# Patient Record
Sex: FEMALE | Race: WHITE | NOT HISPANIC OR LATINO | Employment: OTHER | URBAN - METROPOLITAN AREA
[De-identification: names, ages, dates, MRNs, and addresses within clinical notes are randomized per-mention and may not be internally consistent; named-entity substitution may affect disease eponyms.]

---

## 2020-02-04 NOTE — PROGRESS NOTES
Subjective:     Encounter Date:02/06/2020 - [place] Office    Patient ID: Regine Alfred is a 54 y.o.  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     REFERRING PROVIDER: JOANNE Santiago  REMOTE CARDIOLOGIST: David Rome MD  INTERVENTIONALIST: David Rome MD  ANGIOGRAPHER: RENETTA San MD, [facility], [facility]/Bianca August MD, [facility]    Chief Complaint:   Chief Complaint   Patient presents with   • Coronary Artery Disease     Consult      Problem List:  1. Ischemic heart disease:  a. NSTEMI and left heart catheterization 1/22/2005: Normal coronary arteries, preserved LV systolic function and wall motion, LVEF 55%, there is an area of apical dyskinesis, mild luminal irregularities are present within all 3 vessels with recommendations for aggressive risk factor modification  b. Residual class I symptoms  2. Hyperlipidemia; on statin therapy  3. Osteoarthritis  4. Fibromyalgia  5. Buerger's disease  6. Peripheral vascular disease/claudication  a. [place] [facility] [place]. San): Very mild atherosclerotic plaque of the left subclavian artery, not hemodynamically significant, severe disease of the digital arteries of the left ring finger with subtotal occlusion  b. Abnormal FRANKO, left greater than right, left FRANKO 0.91, right FRANKO 0.97 October 2016  c. Remote CTA relating to AIF with mild disease of her left renal artery with 90% anterior tibial artery occlusion on the right side and is status post angioplasty and Promus CHRISTOPHER November 2016  7. Current tobacco use; 1ppd x 40 years, has tried Chantix and nicotine patches unsuccessfully  8. Bilateral carotid bruits  9. Surgical history:  a. Appendectomy  b. LHC with femoral stent to left lower extremity  c. Tubal ligation  d. Right breast tumor  e. Partial removal of finger on left hand due to Buerger's disease    Allergies    Allergen Reactions   • Novocain [Procaine] Itching                Current Outpatient Medications:   •  brimonidine (ALPHAGAN) 0.2 % ophthalmic solution, Administer 1 drop to both eyes 2 (Two) Times a Day., Disp: , Rfl:   •  Cholecalciferol (VITAMIN D3) 1.25 MG (19556 UT) capsule, Take 50,000 Units by mouth Every 7 (Seven) Days., Disp: , Rfl:   •  clopidogrel (PLAVIX) 75 MG tablet, Take 75 mg by mouth Daily., Disp: , Rfl:   •  DULoxetine (CYMBALTA) 30 MG capsule, Take 30 mg by mouth Daily., Disp: , Rfl:   •  DULoxetine (CYMBALTA) 60 MG capsule, Take 60 mg by mouth Daily., Disp: , Rfl:   •  meloxicam (MOBIC) 7.5 MG tablet, Take 7.5 mg by mouth As Needed., Disp: , Rfl:   •  methocarbamol (ROBAXIN) 750 MG tablet, Take 750 mg by mouth As Needed for Muscle Spasms., Disp: , Rfl:   •  rosuvastatin (CRESTOR) 5 MG tablet, Take 5 mg by mouth Daily., Disp: , Rfl:   •  tiZANidine (ZANAFLEX) 4 MG tablet, Take 4 mg by mouth As Needed for Muscle Spasms., Disp: , Rfl:   •  traMADol (ULTRAM) 50 MG tablet, 50 mg 3 (Three) Times a Day., Disp: , Rfl:     History of Present Illness  This is a 54-year-old white female who presents to [facility] cardiology care and was remotely seen by Dr. Rome.  She had a heart attack at age 38, which she says was caused by a blood clot in her leg (?).  She did not have any stent placements at that time.  A few years ago, she did have peripheral vascular disease and claudication and had a femoral stent to the left lower extremity by Dr. Rome.  The patient has hyperlipidemia but denies any hypertension, type 2 diabetes mellitus, rheumatic fever, CVAs, TIAs, seizures, or PEs.  She remotely had a blood clot in her left lower extremity and has Buerger's disease.  She has been smoking 1-1.5 packs/day since she was a teenager.  She has tried Chantix and nicotine patches unsuccessfully multiple times during her lifetime.  She occasionally has chest discomfort, but she prays about it and rubs her chest, and it  goes away.  She occasionally takes an aspirin with the chest discomfort, and it resolves.  She denies any shortness of breath, palpitations, dizziness, presyncope, or syncope.  She has a family history of CAD.    Cardiovascular Disease Risk Factors  hyperlipidemia     Social History     Socioeconomic History   • Marital status:      Spouse name: Not on file   • Number of children: 5   • Years of education: Not on file   • Highest education level: Not on file   Tobacco Use   • Smoking status: Current Every Day Smoker     Packs/day: 1.00     Types: Cigarettes   • Smokeless tobacco: Never Used   Substance and Sexual Activity   • Alcohol use: Not Currently   • Drug use: Not Currently   • Sexual activity: Defer       Family History   Problem Relation Age of Onset   • Heart disease Mother    • Heart disease Father    • Heart disease Sister    • Heart attack Sister    • Heart disease Brother    • Heart disease Brother        Review of Systems   Constitution: Positive for weight gain.   HENT: Negative.    Eyes: Positive for vision loss in left eye and vision loss in right eye.   Cardiovascular: Positive for palpitations. Negative for chest pain, claudication, dyspnea on exertion, irregular heartbeat, leg swelling, near-syncope and syncope.   Respiratory: Positive for shortness of breath.    Endocrine: Negative.    Hematologic/Lymphatic:        Buerger's disease   Skin: Negative.    Musculoskeletal: Positive for arthritis.   Gastrointestinal: Negative.    Genitourinary: Positive for frequency.   Neurological: Positive for excessive daytime sleepiness. Negative for dizziness, focal weakness and seizures.   Psychiatric/Behavioral: The patient is nervous/anxious.       Obtained and negative except as outlined in problem list and HPI.      ECG 12 Lead  Date/Time: 2/6/2020 11:01 AM  Performed by: Chico Meredith MD  Authorized by: Chico Meredith MD   Rhythm comments: Normal sinus rhythm, normal ECG, 89 bpm QRS 70 ms,  " ms,  ms, no prior ECGs to review                 Objective:       Vitals:    02/06/20 0959 02/06/20 1015 02/06/20 1016 02/06/20 1017   BP: 120/64 114/60 116/62 112/60   BP Location: Right arm Right arm Left arm Left arm   Patient Position: Sitting Standing Sitting Standing   Pulse: 96      Weight: 85.7 kg (189 lb)      Height: 177.8 cm (70\")        Body mass index is 27.12 kg/m².     Physical Exam   Constitutional: She is oriented to person, place, and time. She appears well-developed and well-nourished.   HENT:   Mouth/Throat: Uvula is midline, oropharynx is clear and moist and mucous membranes are normal. No oral lesions. No uvula swelling or lacerations.   Edentulous   Eyes:   Fundoscopic exam:       The right eye shows AV nicking. The right eye shows no exudate and no hemorrhage.        The left eye shows AV nicking. The left eye shows no exudate and no hemorrhage.   Neck: No JVD present. Carotid bruit is present (bilateral, L>R). No thyromegaly present.   Cardiovascular: Regular rhythm, S1 normal and S2 normal. Exam reveals no gallop, no S3 and no friction rub.   Murmur heard.   Medium-pitched harsh early systolic murmur is present with a grade of 2/6 at the upper right sternal border, upper left sternal border and lower left sternal border.  Pulses:       Carotid pulses are 1+ on the right side, and 1+ on the left side.       Radial pulses are 1+ on the right side, and 1+ on the left side.        Femoral pulses are 1+ on the right side, and 1+ on the left side.       Popliteal pulses are 1+ on the right side, and 1+ on the left side.        Dorsalis pedis pulses are 1+ on the right side, and 1+ on the left side.        Posterior tibial pulses are 1+ on the right side, and 1+ on the left side.   Pulmonary/Chest: Effort normal. She has decreased breath sounds. She has no wheezes. She has no rhonchi. She has no rales.   Abdominal: Soft. She exhibits no mass. There is no hepatosplenomegaly. There is " no tenderness. There is no guarding.   Bowel sounds audible x4   Musculoskeletal: Normal range of motion. She exhibits no edema.   Lymphadenopathy:     She has no cervical adenopathy.   Neurological: She is alert and oriented to person, place, and time.   Skin: Skin is warm, dry and intact. No rash noted.   Vitals reviewed.      Lab Review:   1/16/2020:  · CBC: WBC 8, RBC 4.57, hemoglobin 14.2, hematocrit 42.7, MCV 93, MCH 31.1, MCHC 33.3, RDW 13.7, platelets 319, neutrophils 66, lymphocytes 22, monocytes 8, eos 3, basos 1  · CMP: Glucose 122, BUN 8, creatinine 1, GFR 64, sodium 143, potassium 3.7, chloride 102, carbon dioxide 24, calcium 9.6, protein 6.8, globulin 2.2, bilirubin 0.2, alkaline phosphatase 90, AST 23, ALT 19  · Lipid panel: Cholesterol 198, triglycerides 102, HDL 47,   · TSH - 1.74  · Free T4 - 1.05  · Vitamin D - 106    ECG 2/1/2016: Sinus rhythm, normal ECG, 50 bpm,  ms, QRS 80 ms,  ms      Assessment:   Patient with bilateral carotid bruits, left greater than right, in the setting of systolic heart murmur and current tobacco use.  We will order an echocardiogram to assess heart structure/function and also a carotid duplex in view of her bruits. I advised the patient of the risks of continuing to use tobacco, and I provided this patient with smoking cessation educational materials and discussed how to quit smoking and patient has not expressed the willingness to quit.  Counseled patient for 3-5 minutes.  She has tried Chantix and nicotine patches multiple times in the past unsuccessfully.  The patient had an abnormal lipid panel in January 2020, and we would recommend that she increase rosuvastatin to 10 mg daily.  She is aware of the risk of digital and limb loss with Buerger's disease and continued tobacco use.     Diagnosis Plan   1. IHD (ischemic heart disease)   No recurrent angina pectoris or CHF on current activity schedule; continue current treatment; will reassess  residual LV function and remote wall motion abnormality and cardiac murmur with an echocardiogram.     2. Hyperlipidemia LDL goal <100   Increase rosuvastatin to 10 mg daily   3. PVD (peripheral vascular disease) with claudication (CMS/HCC)   Carotid duplex   4. Tobacco use   I advised the patient of the risks of continuing to use tobacco, and I provided this patient with smoking cessation educational materials and discussed how to quit smoking and patient has expressed some willingness to quit.  Counseled patient for 3-5 minutes; she has tried Chantix and nicotine patches in the past without success.     5.  Bilateral carotid bruits: Carotid duplex       Plan:       1. Patient to continue current medications and close follow up with the above providers other than to increase rosuvastatin dose to 10 mg daily.  2. Tentative cardiology follow up in 6 weeks or patient may return sooner PRN.  3. Carotid duplex  4. Echocardiogram  5. Encouraged tobacco cessation  6. 1 800 card provided    Scribed for Chico Meredith MD by Kristen Arboleda, JOANNE. 2/6/2020  10:17 AM    I, Chico Meredith MD, [facility], personally performed the services described in this documentation as scribed by the above named individual in my presence, and it is both accurate and complete. At 11:33 AM on 02/06/2020

## 2020-02-06 ENCOUNTER — CONSULT (OUTPATIENT)
Dept: CARDIOLOGY | Facility: CLINIC | Age: 55
End: 2020-02-06

## 2020-02-06 VITALS
DIASTOLIC BLOOD PRESSURE: 60 MMHG | BODY MASS INDEX: 27.06 KG/M2 | WEIGHT: 189 LBS | SYSTOLIC BLOOD PRESSURE: 112 MMHG | HEART RATE: 96 BPM | HEIGHT: 70 IN

## 2020-02-06 DIAGNOSIS — I25.9 IHD (ISCHEMIC HEART DISEASE): Primary | ICD-10-CM

## 2020-02-06 DIAGNOSIS — R09.89 BILATERAL CAROTID BRUITS: ICD-10-CM

## 2020-02-06 DIAGNOSIS — E78.5 HYPERLIPIDEMIA LDL GOAL <100: ICD-10-CM

## 2020-02-06 DIAGNOSIS — Z72.0 TOBACCO USE: ICD-10-CM

## 2020-02-06 DIAGNOSIS — I73.9 PVD (PERIPHERAL VASCULAR DISEASE) WITH CLAUDICATION (HCC): ICD-10-CM

## 2020-02-06 PROBLEM — M06.09 RHEUMATOID ARTHRITIS OF MULTIPLE SITES WITH NEGATIVE RHEUMATOID FACTOR: Status: ACTIVE | Noted: 2020-02-06

## 2020-02-06 PROBLEM — I10 ESSENTIAL HYPERTENSION: Status: ACTIVE | Noted: 2020-02-06

## 2020-02-06 PROBLEM — I73.1 BUERGER'S DISEASE (HCC): Status: ACTIVE | Noted: 2020-02-06

## 2020-02-06 PROCEDURE — 93000 ELECTROCARDIOGRAM COMPLETE: CPT | Performed by: INTERNAL MEDICINE

## 2020-02-06 PROCEDURE — 99204 OFFICE O/P NEW MOD 45 MIN: CPT | Performed by: INTERNAL MEDICINE

## 2020-02-06 RX ORDER — ROSUVASTATIN CALCIUM 5 MG/1
5 TABLET, COATED ORAL DAILY
COMMUNITY
End: 2020-02-06 | Stop reason: DRUGHIGH

## 2020-02-06 RX ORDER — TIZANIDINE 4 MG/1
4 TABLET ORAL AS NEEDED
COMMUNITY

## 2020-02-06 RX ORDER — METHOCARBAMOL 750 MG/1
TABLET, FILM COATED ORAL AS NEEDED
COMMUNITY
End: 2022-07-18

## 2020-02-06 RX ORDER — ROSUVASTATIN CALCIUM 10 MG/1
10 TABLET, COATED ORAL DAILY
Qty: 90 TABLET | Refills: 3 | Status: SHIPPED | OUTPATIENT
Start: 2020-02-06 | End: 2021-02-09 | Stop reason: DRUGHIGH

## 2020-02-06 RX ORDER — MELOXICAM 7.5 MG/1
7.5 TABLET ORAL AS NEEDED
COMMUNITY
End: 2020-11-05

## 2020-02-06 RX ORDER — BRIMONIDINE TARTRATE 2 MG/ML
1 SOLUTION/ DROPS OPHTHALMIC 2 TIMES DAILY
COMMUNITY
Start: 2020-01-24 | End: 2022-07-18

## 2020-02-06 RX ORDER — CHOLECALCIFEROL (VITAMIN D3) 1250 MCG
50000 CAPSULE ORAL
COMMUNITY

## 2020-02-06 RX ORDER — DULOXETIN HYDROCHLORIDE 30 MG/1
30 CAPSULE, DELAYED RELEASE ORAL EVERY MORNING
COMMUNITY

## 2020-02-06 RX ORDER — DULOXETIN HYDROCHLORIDE 60 MG/1
60 CAPSULE, DELAYED RELEASE ORAL NIGHTLY
COMMUNITY

## 2020-02-06 RX ORDER — TRAMADOL HYDROCHLORIDE 50 MG/1
50 TABLET ORAL 3 TIMES DAILY
COMMUNITY
Start: 2020-01-16

## 2020-02-06 RX ORDER — CLOPIDOGREL BISULFATE 75 MG/1
75 TABLET ORAL NIGHTLY
COMMUNITY

## 2020-02-25 ENCOUNTER — HOSPITAL ENCOUNTER (OUTPATIENT)
Dept: CARDIOLOGY | Facility: HOSPITAL | Age: 55
Discharge: HOME | End: 2020-02-25
Admitting: INTERNAL MEDICINE

## 2020-02-25 ENCOUNTER — HOSPITAL ENCOUNTER (OUTPATIENT)
Dept: CARDIOLOGY | Facility: HOSPITAL | Age: 55
Discharge: HOME | End: 2020-02-25

## 2020-02-25 VITALS
HEIGHT: 70 IN | WEIGHT: 189 LBS | BODY MASS INDEX: 27.06 KG/M2 | WEIGHT: 189 LBS | HEIGHT: 70 IN | BODY MASS INDEX: 27.06 KG/M2

## 2020-02-25 DIAGNOSIS — R09.89 BILATERAL CAROTID BRUITS: ICD-10-CM

## 2020-02-25 DIAGNOSIS — I25.9 IHD (ISCHEMIC HEART DISEASE): ICD-10-CM

## 2020-02-25 LAB
ASCENDING AORTA: 2.9 CM
BH CV ECHO MEAS - AO ROOT AREA (BSA CORRECTED): 1.5
BH CV ECHO MEAS - AO ROOT AREA: 7.1 CM^2
BH CV ECHO MEAS - AO ROOT DIAM: 3 CM
BH CV ECHO MEAS - ASC AORTA: 2.9 CM
BH CV ECHO MEAS - BSA(HAYCOCK): 2.1 M^2
BH CV ECHO MEAS - BSA(HAYCOCK): 2.1 M^2
BH CV ECHO MEAS - BSA: 2 M^2
BH CV ECHO MEAS - BSA: 2 M^2
BH CV ECHO MEAS - BZI_BMI: 27.1 KILOGRAMS/M^2
BH CV ECHO MEAS - BZI_BMI: 27.1 KILOGRAMS/M^2
BH CV ECHO MEAS - BZI_METRIC_HEIGHT: 177.8 CM
BH CV ECHO MEAS - BZI_METRIC_HEIGHT: 177.8 CM
BH CV ECHO MEAS - BZI_METRIC_WEIGHT: 85.7 KG
BH CV ECHO MEAS - BZI_METRIC_WEIGHT: 85.7 KG
BH CV ECHO MEAS - EDV(CUBED): 95.4 ML
BH CV ECHO MEAS - EDV(MOD-SP2): 78 ML
BH CV ECHO MEAS - EDV(MOD-SP4): 85 ML
BH CV ECHO MEAS - EDV(TEICH): 95.9 ML
BH CV ECHO MEAS - EF(CUBED): 59.2 %
BH CV ECHO MEAS - EF(MOD-BP): 56 %
BH CV ECHO MEAS - EF(MOD-SP2): 53.8 %
BH CV ECHO MEAS - EF(MOD-SP4): 56.5 %
BH CV ECHO MEAS - EF(TEICH): 50.9 %
BH CV ECHO MEAS - ESV(CUBED): 39 ML
BH CV ECHO MEAS - ESV(MOD-SP2): 36 ML
BH CV ECHO MEAS - ESV(MOD-SP4): 37 ML
BH CV ECHO MEAS - ESV(TEICH): 47.1 ML
BH CV ECHO MEAS - FS: 25.8 %
BH CV ECHO MEAS - IVS/LVPW: 1.3
BH CV ECHO MEAS - IVSD: 1.1 CM
BH CV ECHO MEAS - LA DIMENSION: 3 CM
BH CV ECHO MEAS - LA/AO: 1
BH CV ECHO MEAS - LAD MAJOR: 4.5 CM
BH CV ECHO MEAS - LAT PEAK E' VEL: 10 CM/SEC
BH CV ECHO MEAS - LATERAL E/E' RATIO: 9.4
BH CV ECHO MEAS - LV DIASTOLIC VOL/BSA (35-75): 41.7 ML/M^2
BH CV ECHO MEAS - LV MASS(C)D: 149.2 GRAMS
BH CV ECHO MEAS - LV MASS(C)DI: 73.2 GRAMS/M^2
BH CV ECHO MEAS - LV SYSTOLIC VOL/BSA (12-30): 18.2 ML/M^2
BH CV ECHO MEAS - LVIDD: 4.6 CM
BH CV ECHO MEAS - LVIDS: 3.1 CM
BH CV ECHO MEAS - LVLD AP2: 7.5 CM
BH CV ECHO MEAS - LVLD AP4: 7.5 CM
BH CV ECHO MEAS - LVLS AP2: 6.8 CM
BH CV ECHO MEAS - LVLS AP4: 7 CM
BH CV ECHO MEAS - LVPWD: 1.1 CM
BH CV ECHO MEAS - MED PEAK E' VEL: 8.8 CM/SEC
BH CV ECHO MEAS - MEDIAL E/E' RATIO: 10.8
BH CV ECHO MEAS - MV A MAX VEL: 59.7 CM/SEC
BH CV ECHO MEAS - MV DEC TIME: 0.23 SEC
BH CV ECHO MEAS - MV E MAX VEL: 94.3 CM/SEC
BH CV ECHO MEAS - MV E/A: 1.6
BH CV ECHO MEAS - PA ACC SLOPE: 303 CM/SEC^2
BH CV ECHO MEAS - PA ACC TIME: 0.16 SEC
BH CV ECHO MEAS - PA PR(ACCEL): 9.3 MMHG
BH CV ECHO MEAS - RAP SYSTOLE: 3 MMHG
BH CV ECHO MEAS - RVSP: 24 MMHG
BH CV ECHO MEAS - SI(CUBED): 27.7 ML/M^2
BH CV ECHO MEAS - SI(MOD-SP2): 20.6 ML/M^2
BH CV ECHO MEAS - SI(MOD-SP4): 23.6 ML/M^2
BH CV ECHO MEAS - SI(TEICH): 23.9 ML/M^2
BH CV ECHO MEAS - SV(CUBED): 56.5 ML
BH CV ECHO MEAS - SV(MOD-SP2): 42 ML
BH CV ECHO MEAS - SV(MOD-SP4): 48 ML
BH CV ECHO MEAS - SV(TEICH): 48.8 ML
BH CV ECHO MEAS - TAPSE (>1.6): 1.6 CM2
BH CV ECHO MEAS - TR MAX PG: 21 MMHG
BH CV ECHO MEAS - TR MAX VEL: 228 CM/SEC
BH CV ECHO MEASUREMENTS AVERAGE E/E' RATIO: 10.03
BH CV VAS BP LEFT ARM: NORMAL MMHG
BH CV VAS BP RIGHT ARM: NORMAL MMHG
BH CV XLRA - RV BASE: 2.9 CM
BH CV XLRA - RV LENGTH: 7.1 CM
BH CV XLRA - RV MID: 2.4 CM
BH CV XLRA - TDI S': 11.2 CM/SEC
BH CV XLRA MEAS LEFT CCA RATIO VEL: 129 CM/SEC
BH CV XLRA MEAS LEFT DIST CCA EDV: 30.4 CM/SEC
BH CV XLRA MEAS LEFT DIST CCA PSV: 112 CM/SEC
BH CV XLRA MEAS LEFT DIST ICA EDV: 47.1 CM/SEC
BH CV XLRA MEAS LEFT DIST ICA PSV: 117 CM/SEC
BH CV XLRA MEAS LEFT ICA RATIO VEL: 125 CM/SEC
BH CV XLRA MEAS LEFT ICA/CCA RATIO: 1
BH CV XLRA MEAS LEFT MID CCA EDV: 36.3 CM/SEC
BH CV XLRA MEAS LEFT MID CCA PSV: 129 CM/SEC
BH CV XLRA MEAS LEFT MID ICA EDV: 40.3 CM/SEC
BH CV XLRA MEAS LEFT MID ICA PSV: 125 CM/SEC
BH CV XLRA MEAS LEFT PROX CCA EDV: 44.9 CM/SEC
BH CV XLRA MEAS LEFT PROX CCA PSV: 170 CM/SEC
BH CV XLRA MEAS LEFT PROX ECA PSV: 153 CM/SEC
BH CV XLRA MEAS LEFT PROX ICA EDV: 37.3 CM/SEC
BH CV XLRA MEAS LEFT PROX ICA PSV: 118 CM/SEC
BH CV XLRA MEAS LEFT PROX SCLA PSV: 428 CM/SEC
BH CV XLRA MEAS LEFT VERTEBRAL A PSV: 41.3 CM/SEC
BH CV XLRA MEAS RIGHT CCA RATIO VEL: 104 CM/SEC
BH CV XLRA MEAS RIGHT DIST CCA EDV: 29.9 CM/SEC
BH CV XLRA MEAS RIGHT DIST CCA PSV: 95.9 CM/SEC
BH CV XLRA MEAS RIGHT DIST ICA EDV: 29.7 CM/SEC
BH CV XLRA MEAS RIGHT DIST ICA PSV: 105 CM/SEC
BH CV XLRA MEAS RIGHT ICA RATIO VEL: 107 CM/SEC
BH CV XLRA MEAS RIGHT ICA/CCA RATIO: 1.4
BH CV XLRA MEAS RIGHT MID CCA EDV: 25.9 CM/SEC
BH CV XLRA MEAS RIGHT MID CCA PSV: 104 CM/SEC
BH CV XLRA MEAS RIGHT MID ICA EDV: 36.7 CM/SEC
BH CV XLRA MEAS RIGHT MID ICA PSV: 107 CM/SEC
BH CV XLRA MEAS RIGHT PROX CCA EDV: 30.2 CM/SEC
BH CV XLRA MEAS RIGHT PROX CCA PSV: 127 CM/SEC
BH CV XLRA MEAS RIGHT PROX ECA PSV: 147 CM/SEC
BH CV XLRA MEAS RIGHT PROX ICA EDV: 37.5 CM/SEC
BH CV XLRA MEAS RIGHT PROX ICA PSV: 141 CM/SEC
BH CV XLRA MEAS RIGHT PROX SCLA PSV: 195 CM/SEC
BH CV XLRA MEAS RIGHT VERTEBRAL A PSV: 70.7 CM/SEC
LEFT ARM BP: NORMAL MMHG
LEFT ATRIUM VOLUME INDEX: 19.6 ML/M^2
LEFT ATRIUM VOLUME: 40 ML
LV EF 2D ECHO EST: 65 %
RIGHT ARM BP: NORMAL MMHG

## 2020-02-25 PROCEDURE — 93306 TTE W/DOPPLER COMPLETE: CPT

## 2020-02-25 PROCEDURE — 93880 EXTRACRANIAL BILAT STUDY: CPT | Performed by: INTERNAL MEDICINE

## 2020-02-25 PROCEDURE — 93880 EXTRACRANIAL BILAT STUDY: CPT

## 2020-02-25 PROCEDURE — 93306 TTE W/DOPPLER COMPLETE: CPT | Performed by: INTERNAL MEDICINE

## 2020-11-05 ENCOUNTER — OFFICE VISIT (OUTPATIENT)
Dept: CARDIOLOGY | Facility: CLINIC | Age: 55
End: 2020-11-05

## 2020-11-05 VITALS
BODY MASS INDEX: 25.33 KG/M2 | SYSTOLIC BLOOD PRESSURE: 102 MMHG | DIASTOLIC BLOOD PRESSURE: 70 MMHG | WEIGHT: 171 LBS | HEIGHT: 69 IN | HEART RATE: 96 BPM | TEMPERATURE: 97.5 F

## 2020-11-05 DIAGNOSIS — I25.9 IHD (ISCHEMIC HEART DISEASE): Primary | ICD-10-CM

## 2020-11-05 DIAGNOSIS — E78.5 HYPERLIPIDEMIA LDL GOAL <100: ICD-10-CM

## 2020-11-05 DIAGNOSIS — Z72.0 TOBACCO USE: ICD-10-CM

## 2020-11-05 DIAGNOSIS — I73.9 PVD (PERIPHERAL VASCULAR DISEASE) WITH CLAUDICATION (HCC): ICD-10-CM

## 2020-11-05 PROCEDURE — 99214 OFFICE O/P EST MOD 30 MIN: CPT | Performed by: INTERNAL MEDICINE

## 2020-11-05 NOTE — PROGRESS NOTES
Subjective:     Encounter Date:11/05/2020    Patient ID: Regine Alfred is a 55 y.o.  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     REFERRING PROVIDER: JOANNE Santiago  REMOTE CARDIOLOGIST: David Rome MD  INTERVENTIONALIST: David Rome MD  ANGIOGRAPHER: RENETTA San MD, [facility], [facility]/Bianca August MD, [facility]    Chief Complaint:   Chief Complaint   Patient presents with   • ischemic heart disease       Problem List:  1. Ischemic heart disease:  a. NSTEMI and left heart catheterization 1/22/2005: Normal coronary arteries, preserved LV systolic function and wall motion, LVEF 55%, there is an area of apical dyskinesis, mild luminal irregularities are present within all 3 vessels with recommendations for aggressive risk factor modification  b. Echocardiogram 02/25/2020: LVEF 65%, mild TR, normal RV cavity size, wall thickness, systolic function and septal motion noted.  No pulmonary hypertension, no pericardial effusion, no significant structural valvular abnormality demonstrated  c. Residual class I symptoms, November 2020  2. Hyperlipidemia; on statin therapy  3. Osteoarthritis  4. Fibromyalgia  5. Buerger's disease  6. Peripheral vascular disease/claudication  a. Thoracic [place] [facility] [place]Dr. San): Very mild atherosclerotic plaque of the left subclavian artery, not hemodynamically significant, severe disease of the digital arteries of the left ring finger with subtotal occlusion  b. Abnormal FRANKO, left greater than right, left FRANKO 0.91, right FRANKO 0.97 October 2016  c. Remote CTA relating to [facility] with mild disease of her left renal artery with 90% anterior tibial artery occlusion on the right side and is status post angioplasty and Promus CHRISTOPHER November 2016  d. Carotid duplex February 2020 demonstrating bilateral 0-49% stenosis with nominal antegrade right vertebral  artery flow with biphasic left vertebral artery flow and probable left subclavian artery stenosis  7. Current tobacco use; 1ppd x 40 years, has tried Chantix and nicotine patches unsuccessfully, down to 0.5 packs/day November 2020, using nicotine lozenges  8. Bilateral carotid bruits with carotid duplex February 2020 demonstrating bilateral 0-49% stenosis with nominal antegrade right vertebral artery flow with biphasic left vertebral artery flow and probable left subclavian artery stenosis  9. Forehead nodules with upcoming dermatology appointment December 2020  10. Intermittent sharp temple pain  11. Surgical history:  a. Appendectomy  b. LHC with femoral stent to left lower extremity  c. Tubal ligation  d. Right breast tumor  e. Partial removal of finger on left hand due to Buerger's disease    Allergies   Allergen Reactions   • Novocain [Procaine] Itching              Current Outpatient Medications:   •  brimonidine (ALPHAGAN) 0.2 % ophthalmic solution, Administer 1 drop to both eyes 2 (Two) Times a Day., Disp: , Rfl:   •  Cholecalciferol (VITAMIN D3) 1.25 MG (99026 UT) capsule, Take 50,000 Units by mouth Every 7 (Seven) Days., Disp: , Rfl:   •  clopidogrel (PLAVIX) 75 MG tablet, Take 75 mg by mouth Daily., Disp: , Rfl:   •  DULoxetine (CYMBALTA) 30 MG capsule, Take 30 mg by mouth Daily., Disp: , Rfl:   •  DULoxetine (CYMBALTA) 60 MG capsule, Take 60 mg by mouth Daily., Disp: , Rfl:   •  methocarbamol (ROBAXIN) 750 MG tablet, Take 750 mg by mouth As Needed for Muscle Spasms., Disp: , Rfl:   •  rosuvastatin (CRESTOR) 10 MG tablet, Take 1 tablet by mouth Daily., Disp: 90 tablet, Rfl: 3  •  tiZANidine (ZANAFLEX) 4 MG tablet, Take 4 mg by mouth As Needed for Muscle Spasms., Disp: , Rfl:   •  traMADol (ULTRAM) 50 MG tablet, 50 mg 3 (Three) Times a Day., Disp: , Rfl:     History of Present Illness: Patient return for follow up after a 9-month hiatus. Patient was last seen in consult on 02/06/2020.  She had an  "acceptable echocardiogram and carotid duplex in February 2020.  When the patient occasionally rolls on her left side will have some chest squeezing and occasionally she will have to take aspirin which alleviates her pain.  She does not experience any chest pain any other time and has no symptoms with it whenever she has it.  It only last for a few seconds and then resolves.  The patient has developed a couple nodules on her forehead and is supposed to see a dermatologist next month for evaluation. The patient in the past month has had intermittent sharp bilateral temple pain that only last a few seconds.  She does not have a neurologist and has not had any CT scans of her head.  She can sometimes have this on a daily basis and it stops her from doing her activity for a few seconds when it occurs.  She denies any visual disturbances, weakness, dizziness, palpitations, presyncope, or syncope when she has these episodes.  She is down to 0.5 packs/day and is using nicotine lozenges.  She has unsuccessfully used Chantix and nicotine patches in the past.  She has not had her influenza vaccination this year.        Review of Systems   All other systems reviewed and are negative.     Obtained and negative except as outlined in problem list and HPI.    Procedures       Objective:       Vitals:    11/05/20 1139 11/05/20 1140   BP: 108/70 102/70   BP Location: Left arm Left arm   Patient Position: Sitting Standing   Pulse: 94 96   Temp: 97.5 °F (36.4 °C)    Weight: 77.6 kg (171 lb)    Height: 175.3 cm (69\")      Body mass index is 25.25 kg/m².  Last weight: 189 lbs    Vitals signs reviewed.   Constitutional:       Appearance: Well-developed.   HENT:      Head:        Comments: Forehead nodules  Neck:      Thyroid: No thyromegaly.      Vascular: Carotid bruit (bilateral L>R) present. No JVD.      Lymphadenopathy: No cervical adenopathy.   Pulmonary:      Effort: Pulmonary effort is normal.      Breath sounds: Decreased breath " sounds present. No wheezing. No rhonchi. No rales.   Cardiovascular:      Regular rhythm.      Murmurs: There is a grade 2/6 mid frequency harsh early systolic murmur at the URSB, LLSB and ULSB.      No gallop. No S3 gallop.   Pulses:     Dorsalis pedis: 1+ bilaterally.     Posterior tibial: 1+ bilaterally.  Edema:     Peripheral edema absent.   Abdominal:      Palpations: Abdomen is soft. There is no abdominal mass.      Tenderness: There is no abdominal tenderness. There is no guarding.   Musculoskeletal: Normal range of motion.   Skin:     General: Skin is warm and dry.      Findings: No rash.   Neurological:      Mental Status: Alert and oriented to person, place, and time.           Lab Review:     Carotid duplex, 02/25/2020 (reviewed with patient by letter):  · Proximal right internal carotid artery plaque without significant stenosis.  · Right internal carotid artery stenosis of 0-49%.  · Proximal left internal carotid artery plaque without significant stenosis.  · Left internal carotid artery stenosis of 0-49%.  · Nominal antegrade right vertebral artery flow with biphasic left vertebral artery flow and probable left subclavian artery stenosis.    Echocardiogram, 02/25/2020 (reviewed with patient by letter):  · Left ventricular systolic function is normal.  · Mild tricuspid valve regurgitation is present.  · Left ventricular diastolic function is normal.  · Normal right ventricular cavity size, wall thickness, systolic function and septal motion noted.  · No evidence of pulmonary hypertension is present.  · There is no evidence of pericardial effusion.    01/16/2020:  · CBC: WBC 8, RBC 4.57, hemoglobin 14.2, hematocrit 42.7, MCV 93, MCH 31.1, MCHC 33.3, RDW 13.7, platelets 319, neutrophils 66, lymphocytes 22, monocytes 8, eos 3, basos 1  · CMP: Glucose 122, BUN 8, creatinine 1, GFR 64, sodium 143, potassium 3.7, chloride 102, carbon dioxide 24, calcium 9.6, protein 6.8, globulin 2.2, bilirubin 0.2, alkaline  phosphatase 90, AST 23, ALT 19  · Lipid panel: Cholesterol 198, triglycerides 102, HDL 47,   · TSH - 1.74  · Free T4 - 1.05  · Vitamin D - 106No significant structural valvular abnormality demonstrated.      Assessment:       Overall continued acceptable course with no new interim cardiopulmonary complaints with acceptable functional status. We will defer additional diagnostic or therapeutic intervention from a cardiac perspective at this time. I advised the patient of the risks of continuing to use tobacco, and I provided this patient with smoking cessation educational materials and discussed how to quit smoking and patient has expressed the willingness to quit and will continue to try weaning off of cigarettes using nicotine lozenges.  Counseled patient for 3-5 minutes.  I encouraged the patient to follow-up with her dermatologist in December 2020 for her forehead nodules.     Diagnosis Plan   1. IHD (ischemic heart disease)  No recurrent angina pectoris or CHF on current activity schedule; continue current treatment   2. Hyperlipidemia LDL goal <100   No new labs to review, continue rosuvastatin   3. PVD (peripheral vascular disease) with claudication (CMS/HCC)   No worsening claudication currently   4. Tobacco use   0.5 packs/day, encouraged tobacco cessation, the patient is using nicotine lozenges          Plan:         1. Patient to continue current medications and close follow up with the above providers.  2. Tentative cardiology follow up in June 2021 or patient may return sooner PRN.  3. Tobacco cessation encouraged  4. Influenza immunization strongly encourage; the rationale and low risk and potential large benefit discussed and reviewed with patient in office today.    Scribed for Chico Meredith MD by Kristen Arboleda, APRN. 11/5/2020  12:06 EST     I, Chico Meredith MD, [facility], personally performed the services described in this documentation as scribed by the above named individual in my  presence, and it is both accurate and complete. At 12:54 EST on 11/05/2020

## 2021-01-22 ENCOUNTER — TELEPHONE (OUTPATIENT)
Dept: CARDIOLOGY | Facility: CLINIC | Age: 56
End: 2021-01-22

## 2021-01-22 RX ORDER — SODIUM, POTASSIUM,MAG SULFATES 17.5-3.13G
2 SOLUTION, RECONSTITUTED, ORAL ORAL TAKE AS DIRECTED
Qty: 354 ML | Refills: 0 | Status: CANCELLED | OUTPATIENT
Start: 2021-01-22

## 2021-01-22 NOTE — TELEPHONE ENCOUNTER
Patient is scheduled to have colonoscopy 1/27/2021 with Dr. Currie. It is possible she may have biopsies.      Pt currently taking:  Plavix 75 mg daily        How long should patient hold Plavix?    Please advise.

## 2021-01-22 NOTE — TELEPHONE ENCOUNTER
Noted; would hold clopidogrel x5 days and resume the same day after colonoscopy if okay with Dr. Dick.    Thanks!

## 2021-01-24 ENCOUNTER — APPOINTMENT (OUTPATIENT)
Dept: PREADMISSION TESTING | Facility: HOSPITAL | Age: 56
End: 2021-01-24

## 2021-01-24 PROCEDURE — C9803 HOPD COVID-19 SPEC COLLECT: HCPCS

## 2021-01-24 PROCEDURE — U0004 COV-19 TEST NON-CDC HGH THRU: HCPCS

## 2021-01-25 LAB — SARS-COV-2 RNA RESP QL NAA+PROBE: NOT DETECTED

## 2021-01-26 RX ORDER — ONDANSETRON 4 MG/1
4 TABLET, FILM COATED ORAL EVERY 8 HOURS PRN
Qty: 2 TABLET | Refills: 0 | Status: SHIPPED | OUTPATIENT
Start: 2021-01-26

## 2021-01-26 RX ORDER — SODIUM, POTASSIUM,MAG SULFATES 17.5-3.13G
2 SOLUTION, RECONSTITUTED, ORAL ORAL TAKE AS DIRECTED
Qty: 354 ML | Refills: 0 | Status: SHIPPED | OUTPATIENT
Start: 2021-01-26 | End: 2021-01-29

## 2021-01-27 ENCOUNTER — OUTSIDE FACILITY SERVICE (OUTPATIENT)
Dept: GASTROENTEROLOGY | Facility: CLINIC | Age: 56
End: 2021-01-27

## 2021-01-27 PROCEDURE — 45385 COLONOSCOPY W/LESION REMOVAL: CPT | Performed by: INTERNAL MEDICINE

## 2021-01-27 PROCEDURE — 45380 COLONOSCOPY AND BIOPSY: CPT | Performed by: INTERNAL MEDICINE

## 2021-01-27 PROCEDURE — 88305 TISSUE EXAM BY PATHOLOGIST: CPT | Performed by: INTERNAL MEDICINE

## 2021-01-28 ENCOUNTER — LAB REQUISITION (OUTPATIENT)
Dept: LAB | Facility: HOSPITAL | Age: 56
End: 2021-01-28

## 2021-01-28 DIAGNOSIS — Z12.11 ENCOUNTER FOR SCREENING FOR MALIGNANT NEOPLASM OF COLON: ICD-10-CM

## 2021-01-28 DIAGNOSIS — D12.8 BENIGN NEOPLASM OF RECTUM: ICD-10-CM

## 2021-01-28 DIAGNOSIS — D12.5 BENIGN NEOPLASM OF SIGMOID COLON: ICD-10-CM

## 2021-01-28 DIAGNOSIS — D12.0 BENIGN NEOPLASM OF CECUM: ICD-10-CM

## 2021-01-29 ENCOUNTER — APPOINTMENT (OUTPATIENT)
Dept: PREADMISSION TESTING | Facility: HOSPITAL | Age: 56
End: 2021-01-29

## 2021-01-29 LAB
CYTO UR: NORMAL
LAB AP CASE REPORT: NORMAL
LAB AP CLINICAL INFORMATION: NORMAL
PATH REPORT.FINAL DX SPEC: NORMAL
PATH REPORT.GROSS SPEC: NORMAL

## 2021-01-29 RX ORDER — SODIUM, POTASSIUM,MAG SULFATES 17.5-3.13G
2 SOLUTION, RECONSTITUTED, ORAL ORAL TAKE AS DIRECTED
Qty: 354 ML | Refills: 0 | Status: SHIPPED | OUTPATIENT
Start: 2021-01-29 | End: 2021-02-09

## 2021-02-09 ENCOUNTER — OFFICE VISIT (OUTPATIENT)
Dept: GASTROENTEROLOGY | Facility: CLINIC | Age: 56
End: 2021-02-09

## 2021-02-09 VITALS
WEIGHT: 176 LBS | DIASTOLIC BLOOD PRESSURE: 69 MMHG | BODY MASS INDEX: 25.99 KG/M2 | HEART RATE: 58 BPM | SYSTOLIC BLOOD PRESSURE: 133 MMHG

## 2021-02-09 DIAGNOSIS — D12.2 ADENOMATOUS POLYP OF ASCENDING COLON: Primary | ICD-10-CM

## 2021-02-09 PROCEDURE — 99213 OFFICE O/P EST LOW 20 MIN: CPT | Performed by: INTERNAL MEDICINE

## 2021-02-09 RX ORDER — FOLIC ACID 1 MG/1
TABLET ORAL DAILY
COMMUNITY
Start: 2021-02-03

## 2021-02-09 RX ORDER — ROSUVASTATIN CALCIUM 20 MG/1
20 TABLET, COATED ORAL DAILY
Qty: 90 TABLET | Refills: 3 | Status: SHIPPED | OUTPATIENT
Start: 2021-02-09

## 2021-02-09 NOTE — TELEPHONE ENCOUNTER
Called pt regarding lab results letter per [facility] 2/5/2021. Pt agreeable to increasing rosuvastatin to 20 mg daily. RX sent, Pt verbalizes understanding and agreeable to plan.

## 2021-02-09 NOTE — PROGRESS NOTES
PCP:  Leticia Reyes APRN     No referring provider defined for this encounter.    Chief Complaint   Patient presents with   • Follow-up        HPI   Patient is a 55-year-old known to me.  She had a positive Cologuard on her initial screening evaluation she had multiple abnormal areas.  Multiple flat polypoid areas were biopsied in the colon.  She also had a flat 2 cm polyp in the cecum.  The cecal polyp came back as serrated adenoma.  The other biopsies came back hyperplastic polyps.  At the time of her colonoscopy she had not been off her Plavix for 5 days.    Allergies   Allergen Reactions   • Novocain [Procaine] Itching                 Current Outpatient Medications:   •  brimonidine (ALPHAGAN) 0.2 % ophthalmic solution, Administer 1 drop to both eyes 2 (Two) Times a Day., Disp: , Rfl:   •  Cholecalciferol (VITAMIN D3) 1.25 MG (34304 UT) capsule, Take 50,000 Units by mouth Every 7 (Seven) Days., Disp: , Rfl:   •  clopidogrel (PLAVIX) 75 MG tablet, Take 75 mg by mouth Daily., Disp: , Rfl:   •  DULoxetine (CYMBALTA) 30 MG capsule, Take 30 mg by mouth Daily., Disp: , Rfl:   •  DULoxetine (CYMBALTA) 60 MG capsule, Take 60 mg by mouth Daily., Disp: , Rfl:   •  folic acid (FOLVITE) 1 MG tablet, , Disp: , Rfl:   •  methocarbamol (ROBAXIN) 750 MG tablet, Take 750 mg by mouth As Needed for Muscle Spasms., Disp: , Rfl:   •  ondansetron (ZOFRAN) 4 MG tablet, Take 1 tablet by mouth Every 8 (Eight) Hours As Needed for Nausea or Vomiting., Disp: 2 tablet, Rfl: 0  •  rosuvastatin (CRESTOR) 20 MG tablet, Take 1 tablet by mouth Daily., Disp: 90 tablet, Rfl: 3  •  tiZANidine (ZANAFLEX) 4 MG tablet, Take 4 mg by mouth As Needed for Muscle Spasms., Disp: , Rfl:   •  traMADol (ULTRAM) 50 MG tablet, 50 mg 3 (Three) Times a Day., Disp: , Rfl:      Past Medical History:   Diagnosis Date   • Anxiety    • Arthritis    • Easy bruising    • Glaucoma    • H/O blood clots    • Heart attack (CMS/HCC)    • History of chicken pox         Past Surgical History:   Procedure Laterality Date   • CORONARY STENT PLACEMENT          Social History     Socioeconomic History   • Marital status:      Spouse name: Not on file   • Number of children: Not on file   • Years of education: Not on file   • Highest education level: Not on file   Tobacco Use   • Smoking status: Current Every Day Smoker     Packs/day: 1.00     Types: Cigarettes   • Smokeless tobacco: Never Used   Substance and Sexual Activity   • Alcohol use: Not Currently   • Drug use: Not Currently   • Sexual activity: Defer        Family History   Problem Relation Age of Onset   • Heart disease Mother    • Heart disease Father    • Heart disease Sister    • Heart attack Sister    • Heart disease Brother    • Heart disease Brother         Review of Systems   Constitutional: Negative.    HENT: Negative for trouble swallowing and voice change.    Gastrointestinal: Negative.         Vitals:    02/09/21 1453   BP: 133/69   Pulse: 58        Physical Exam   General Appearance: Alert, in no acute distress   Head: Normocephalic, without obvious abnormality, atraumatic   Eyes: Lids and lashes normal, conjunctivae and sclerae normal, no icterus, no pallor, corneas clear, PERRLA   Ears: Ears appear intact with no abnormalities noted   Extremities: Moves all extremities well, no edema, no cyanosis, no redness   Skin: No bleeding, bruising or rash   Neurologic: Cranial nerves 2 - 12 grossly intact, no focal deficits     Review of systems was reviewed and positives are noted. All of the remaining review of systems in that system are negative.    Diagnoses and all orders for this visit:    1. Adenomatous polyp of ascending colon (Primary)    Impressions and plan #1 cecal serrated adenoma: This needs to be removed.  She will likely need to have submucosal lifting and removal.  She had some other unusual polypoid areas that were biopsied and all the biopsies came back hyperplastic polyps.  We discussed  the options at length.  She needs to be off her Plavix for 5 days before the procedure.  She will check with her physicians to be sure that is okay.  We are going to refer her to Dr. Martin and have already talked with him.  I would like to get his opinion on the other polyps that were biopsied and hyperplastic as well.    Sonido Crurie MD

## 2021-02-10 DIAGNOSIS — D12.2 ADENOMATOUS POLYP OF ASCENDING COLON: Primary | ICD-10-CM

## 2021-07-27 ENCOUNTER — OFFICE VISIT (OUTPATIENT)
Dept: GASTROENTEROLOGY | Facility: CLINIC | Age: 56
End: 2021-07-27

## 2021-07-27 VITALS
BODY MASS INDEX: 25.4 KG/M2 | HEART RATE: 89 BPM | DIASTOLIC BLOOD PRESSURE: 76 MMHG | WEIGHT: 172 LBS | TEMPERATURE: 97.3 F | SYSTOLIC BLOOD PRESSURE: 121 MMHG

## 2021-07-27 DIAGNOSIS — D12.2 ADENOMATOUS POLYP OF ASCENDING COLON: Primary | ICD-10-CM

## 2021-07-27 PROCEDURE — 99213 OFFICE O/P EST LOW 20 MIN: CPT | Performed by: NURSE PRACTITIONER

## 2021-07-27 RX ORDER — ASPIRIN 81 MG/1
81 TABLET ORAL DAILY
COMMUNITY

## 2021-07-27 RX ORDER — CELECOXIB 100 MG/1
100 CAPSULE ORAL DAILY
COMMUNITY
Start: 2021-05-25

## 2021-07-27 RX ORDER — ALENDRONATE SODIUM 70 MG/1
70 TABLET ORAL
COMMUNITY
Start: 2021-06-14

## 2021-07-27 NOTE — PROGRESS NOTES
GASTROENTEROLOGY OFFICE NOTE  Regine Alfred  [id number]  1965    CARE TEAM      Patient Care Team:  Leticia Reyes APRN as PCP - General (Family Medicine)    Referring Provider: Leticia Reyes APRN    Chief Complaint   Patient presents with   • Follow-up   • Colon Polyps        HISTORY OF PRESENT ILLNESS:  Ms. Alfred returns in follow-up status post colonoscopy with Dr. Martin in March 2021 for removal of a serrated adenomatous polyp in the cecum.  She had a positive Cologuard on her initial screening evaluation and she had multiple abnormal areas.  Multiple flat polypoid areas were biopsied in the colon and she also had a flat 2 cm polyp in the cecum.  The cecal polyp came back as serrated adenoma, the other biopsies were hyperplastic polyps.  She was referred to Dr. Martin for mucosal resection of the polyp in the cecum.  This record was reviewed and shows that a single flat 20 mm polyp of benign appearance was found in the cecum.  A mucosal resection was performed and the lesion was completely removed using a snare.  The previously biopsied diminutive sessile polyps were again seen scattered through the colon.  Recommendations were made by Dr. Martin to reevaluate in 1 year with colonoscopy and schedule follow-up with Dr. Martin in 6 months.  She is here today for the 6-month follow-up, she reports an appointment was made with us rather than Dr. Martin.    She is doing very well without any GI complaints.    PAST MEDICAL HISTORY  Past Medical History:   Diagnosis Date   • Anxiety    • Arthritis    • Colon polyp    • Easy bruising    • Glaucoma    • H/O blood clots    • Heart attack (CMS/HCC)    • History of chicken pox         PAST SURGICAL HISTORY  Past Surgical History:   Procedure Laterality Date   • COLONOSCOPY     • CORONARY STENT PLACEMENT          MEDICATIONS:    Current Outpatient Medications:   •  alendronate (FOSAMAX) 70 MG tablet, Take 70 mg by mouth Daily., Disp: , Rfl:   •   aspirin 81 MG EC tablet, Take 81 mg by mouth Daily., Disp: , Rfl:   •  brimonidine (ALPHAGAN) 0.2 % ophthalmic solution, Administer 1 drop to both eyes 2 (Two) Times a Day., Disp: , Rfl:   •  celecoxib (CeleBREX) 100 MG capsule, Take 100 mg by mouth Daily., Disp: , Rfl:   •  Cholecalciferol (VITAMIN D3) 1.25 MG (85763 UT) capsule, Take 50,000 Units by mouth Every 7 (Seven) Days., Disp: , Rfl:   •  clopidogrel (PLAVIX) 75 MG tablet, Take 75 mg by mouth Daily., Disp: , Rfl:   •  DULoxetine (CYMBALTA) 30 MG capsule, Take 30 mg by mouth Daily., Disp: , Rfl:   •  DULoxetine (CYMBALTA) 60 MG capsule, Take 60 mg by mouth Daily., Disp: , Rfl:   •  methocarbamol (ROBAXIN) 750 MG tablet, Take 750 mg by mouth As Needed for Muscle Spasms., Disp: , Rfl:   •  ondansetron (ZOFRAN) 4 MG tablet, Take 1 tablet by mouth Every 8 (Eight) Hours As Needed for Nausea or Vomiting., Disp: 2 tablet, Rfl: 0  •  rosuvastatin (CRESTOR) 20 MG tablet, Take 1 tablet by mouth Daily., Disp: 90 tablet, Rfl: 3  •  tiZANidine (ZANAFLEX) 4 MG tablet, Take 4 mg by mouth As Needed for Muscle Spasms., Disp: , Rfl:   •  traMADol (ULTRAM) 50 MG tablet, 50 mg 3 (Three) Times a Day., Disp: , Rfl:   •  folic acid (FOLVITE) 1 MG tablet, , Disp: , Rfl:     ALLERGIES  Allergies   Allergen Reactions   • Novocain [Procaine] Itching              FAMILY HISTORY:  Family History   Problem Relation Age of Onset   • Heart disease Mother    • Heart disease Father    • Heart disease Sister    • Heart attack Sister    • Heart disease Brother    • Heart disease Brother    • Colon polyps Neg Hx    • Colon cancer Neg Hx        SOCIAL HISTORY  Social History     Socioeconomic History   • Marital status:      Spouse name: Not on file   • Number of children: Not on file   • Years of education: Not on file   • Highest education level: Not on file   Tobacco Use   • Smoking status: Current Every Day Smoker     Packs/day: 1.00     Types: Cigarettes   • Smokeless tobacco: Never  Used   Vaping Use   • Vaping Use: Never used   Substance and Sexual Activity   • Alcohol use: Not Currently   • Drug use: Not Currently   • Sexual activity: Defer       REVIEW OF SYSTEMS  Review of Systems   Constitutional: Negative for activity change, appetite change, chills, diaphoresis, fatigue, fever, unexpected weight gain and unexpected weight loss.   HENT: Negative for trouble swallowing and voice change.    Gastrointestinal: Negative for abdominal distention, abdominal pain, anal bleeding, blood in stool, constipation, diarrhea, nausea, rectal pain, vomiting, GERD and indigestion.         PHYSICAL EXAM   /76 (BP Location: Left arm, Patient Position: Sitting, Cuff Size: Adult)   Pulse 89   Temp 97.3 °F (36.3 °C) (Temporal)   Wt 78 kg (172 lb)   BMI 25.40 kg/m²   Physical Exam  Constitutional:       General: She is not in acute distress.     Appearance: She is well-developed.   HENT:      Head: Normocephalic and atraumatic.      Nose: Nose normal.   Eyes:      Conjunctiva/sclera: Conjunctivae normal.      Pupils: Pupils are equal, round, and reactive to light.   Pulmonary:      Effort: Pulmonary effort is normal.   Abdominal:      General: There is no distension.      Palpations: Abdomen is soft.   Neurological:      Mental Status: She is alert and oriented to person, place, and time.   Psychiatric:         Behavior: Behavior normal.         Judgment: Judgment normal.           Results Review:  Detailed in HPI    ASSESSMENT / PLAN  1.  Serrated adenoma of the cecum  Status post mucosal resection  -Colonoscopy in March 2022    Return in about 8 months (around 3/27/2022) for Colonoscopy.-I am going to coordinate care with Dr. Martin and Dr. Currie.  It appears from Dr. Martin's notes that he intended for her to follow-up with him today rather than our office.  I am going to reach out to him to let him know that we did see her and verify if he would like to see her as well.  Also, will clarify if   Rosey or Dr. Martin will reevaluate her colon again in March.    I discussed the patients findings and my recommendations with patient    JOANNE Ornelas

## 2021-08-11 ENCOUNTER — TELEPHONE (OUTPATIENT)
Dept: GASTROENTEROLOGY | Facility: CLINIC | Age: 56
End: 2021-08-11

## 2021-08-11 NOTE — TELEPHONE ENCOUNTER
Called patient to make sure patient knows she is already scheduled with  on 09/03/2021 but no answer and LVM.

## 2022-01-21 ENCOUNTER — TELEPHONE (OUTPATIENT)
Dept: GASTROENTEROLOGY | Facility: CLINIC | Age: 57
End: 2022-01-21

## 2022-01-21 DIAGNOSIS — K83.8 COMMON BILE DUCT DILATATION: ICD-10-CM

## 2022-01-21 DIAGNOSIS — K86.89 DILATED PANCREATIC DUCT: ICD-10-CM

## 2022-01-21 DIAGNOSIS — R93.3 ABNORMAL MAGNETIC RESONANCE CHOLANGIOPANCREATOGRAPHY (MRCP): ICD-10-CM

## 2022-01-21 DIAGNOSIS — K86.2 PANCREATIC CYST: Primary | ICD-10-CM

## 2022-01-21 NOTE — TELEPHONE ENCOUNTER
Patient returned my phone call and wants to be referred to Dr Martin per Xavi RUBIO. The referral has been sent. Office staff confirmed they would review order and make her appointment.

## 2022-01-21 NOTE — TELEPHONE ENCOUNTER
I called and left patient two  voice messages to return my call so we can confirm they are ok with a  Referral  to Dr Martin for an Endoscopic US per Xavi RUBIO.

## 2022-01-21 NOTE — TELEPHONE ENCOUNTER
Rx Refill Note  Requested Prescriptions      No prescriptions requested or ordered in this encounter      Last office visit with prescribing clinician: 7/27/2021      Next office visit with prescribing clinician: Visit date not found            Bel Boss LPN  01/21/22, 15:44 EST

## 2022-02-02 ENCOUNTER — TELEPHONE (OUTPATIENT)
Dept: GASTROENTEROLOGY | Facility: CLINIC | Age: 57
End: 2022-02-02

## 2022-02-02 NOTE — TELEPHONE ENCOUNTER
----- Message from Lizzette Jamison sent at 2/2/2022  3:22 PM EST -----  Regarding: armand  Pt romelia returning our call, I assume she was trying to reach you. :)

## 2022-02-10 RX ORDER — ROSUVASTATIN CALCIUM 20 MG/1
TABLET, COATED ORAL
Qty: 30 TABLET | OUTPATIENT
Start: 2022-02-10

## 2022-05-24 ENCOUNTER — TELEPHONE (OUTPATIENT)
Dept: CARDIAC SURGERY | Facility: CLINIC | Age: 57
End: 2022-05-24

## 2022-05-24 NOTE — TELEPHONE ENCOUNTER
lvm with pt and pts  to confirm appt for 6/13/22 @ 730am with Dr. Rose.    Please give pt appt info

## 2022-06-03 ENCOUNTER — HOSPITAL ENCOUNTER (INPATIENT)
Facility: HOSPITAL | Age: 57
LOS: 2 days | Discharge: HOME | End: 2022-06-05
Attending: EMERGENCY MEDICINE | Admitting: INTERNAL MEDICINE

## 2022-06-03 ENCOUNTER — APPOINTMENT (OUTPATIENT)
Dept: GENERAL RADIOLOGY | Facility: HOSPITAL | Age: 57
End: 2022-06-03

## 2022-06-03 ENCOUNTER — APPOINTMENT (OUTPATIENT)
Dept: CT IMAGING | Facility: HOSPITAL | Age: 57
End: 2022-06-03

## 2022-06-03 ENCOUNTER — APPOINTMENT (OUTPATIENT)
Dept: CARDIOLOGY | Facility: HOSPITAL | Age: 57
End: 2022-06-03

## 2022-06-03 DIAGNOSIS — J18.9 PNEUMONIA DUE TO INFECTIOUS ORGANISM, UNSPECIFIED LATERALITY, UNSPECIFIED PART OF LUNG: ICD-10-CM

## 2022-06-03 DIAGNOSIS — A41.9 ACUTE SEPSIS: Primary | ICD-10-CM

## 2022-06-03 DIAGNOSIS — R09.02 HYPOXIA: ICD-10-CM

## 2022-06-03 DIAGNOSIS — F17.200 SMOKER: ICD-10-CM

## 2022-06-03 PROBLEM — R07.9 CHEST PAIN: Status: ACTIVE | Noted: 2022-06-03

## 2022-06-03 LAB
ALBUMIN SERPL-MCNC: 2.8 G/DL (ref 3.5–5.2)
ALBUMIN/GLOB SERPL: 0.7 G/DL
ALP SERPL-CCNC: 78 U/L (ref 39–117)
ALT SERPL W P-5'-P-CCNC: 10 U/L (ref 1–33)
ANION GAP SERPL CALCULATED.3IONS-SCNC: 12 MMOL/L (ref 5–15)
ANION GAP SERPL CALCULATED.3IONS-SCNC: 9 MMOL/L (ref 5–15)
AST SERPL-CCNC: 23 U/L (ref 1–32)
BASOPHILS # BLD AUTO: 0.05 10*3/MM3 (ref 0–0.2)
BASOPHILS # BLD AUTO: 0.05 10*3/MM3 (ref 0–0.2)
BASOPHILS NFR BLD AUTO: 0.7 % (ref 0–1.5)
BASOPHILS NFR BLD AUTO: 0.8 % (ref 0–1.5)
BH CV LOWER VASCULAR LEFT COMMON FEMORAL AUGMENT: NORMAL
BH CV LOWER VASCULAR LEFT COMMON FEMORAL COMPRESS: NORMAL
BH CV LOWER VASCULAR LEFT COMMON FEMORAL PHASIC: NORMAL
BH CV LOWER VASCULAR LEFT COMMON FEMORAL SPONT: NORMAL
BH CV LOWER VASCULAR LEFT DISTAL FEMORAL COMPRESS: NORMAL
BH CV LOWER VASCULAR LEFT GASTRONEMIUS COMPRESS: NORMAL
BH CV LOWER VASCULAR LEFT GREATER SAPH AK COMPRESS: NORMAL
BH CV LOWER VASCULAR LEFT GREATER SAPH BK COMPRESS: NORMAL
BH CV LOWER VASCULAR LEFT LESSER SAPH COMPRESS: NORMAL
BH CV LOWER VASCULAR LEFT MID FEMORAL AUGMENT: NORMAL
BH CV LOWER VASCULAR LEFT MID FEMORAL COMPRESS: NORMAL
BH CV LOWER VASCULAR LEFT MID FEMORAL PHASIC: NORMAL
BH CV LOWER VASCULAR LEFT MID FEMORAL SPONT: NORMAL
BH CV LOWER VASCULAR LEFT PERONEAL COMPRESS: NORMAL
BH CV LOWER VASCULAR LEFT POPLITEAL AUGMENT: NORMAL
BH CV LOWER VASCULAR LEFT POPLITEAL COMPRESS: NORMAL
BH CV LOWER VASCULAR LEFT POPLITEAL PHASIC: NORMAL
BH CV LOWER VASCULAR LEFT POPLITEAL SPONT: NORMAL
BH CV LOWER VASCULAR LEFT POSTERIOR TIBIAL COMPRESS: NORMAL
BH CV LOWER VASCULAR LEFT PROFUNDA FEMORAL COMPRESS: NORMAL
BH CV LOWER VASCULAR LEFT PROXIMAL FEMORAL COMPRESS: NORMAL
BH CV LOWER VASCULAR LEFT SAPHENOFEMORAL JUNCTION COMPRESS: NORMAL
BH CV LOWER VASCULAR RIGHT COMMON FEMORAL AUGMENT: NORMAL
BH CV LOWER VASCULAR RIGHT COMMON FEMORAL COMPRESS: NORMAL
BH CV LOWER VASCULAR RIGHT COMMON FEMORAL PHASIC: NORMAL
BH CV LOWER VASCULAR RIGHT COMMON FEMORAL SPONT: NORMAL
BH CV LOWER VASCULAR RIGHT DISTAL FEMORAL COMPRESS: NORMAL
BH CV LOWER VASCULAR RIGHT GASTRONEMIUS COMPRESS: NORMAL
BH CV LOWER VASCULAR RIGHT GREATER SAPH AK COMPRESS: NORMAL
BH CV LOWER VASCULAR RIGHT GREATER SAPH BK COMPRESS: NORMAL
BH CV LOWER VASCULAR RIGHT LESSER SAPH COMPRESS: NORMAL
BH CV LOWER VASCULAR RIGHT MID FEMORAL AUGMENT: NORMAL
BH CV LOWER VASCULAR RIGHT MID FEMORAL COMPRESS: NORMAL
BH CV LOWER VASCULAR RIGHT MID FEMORAL PHASIC: NORMAL
BH CV LOWER VASCULAR RIGHT MID FEMORAL SPONT: NORMAL
BH CV LOWER VASCULAR RIGHT PERONEAL COMPRESS: NORMAL
BH CV LOWER VASCULAR RIGHT POPLITEAL AUGMENT: NORMAL
BH CV LOWER VASCULAR RIGHT POPLITEAL COMPRESS: NORMAL
BH CV LOWER VASCULAR RIGHT POPLITEAL PHASIC: NORMAL
BH CV LOWER VASCULAR RIGHT POPLITEAL SPONT: NORMAL
BH CV LOWER VASCULAR RIGHT POSTERIOR TIBIAL COMPRESS: NORMAL
BH CV LOWER VASCULAR RIGHT PROFUNDA FEMORAL COMPRESS: NORMAL
BH CV LOWER VASCULAR RIGHT PROXIMAL FEMORAL COMPRESS: NORMAL
BH CV LOWER VASCULAR RIGHT SAPHENOFEMORAL JUNCTION COMPRESS: NORMAL
BILIRUB SERPL-MCNC: 0.2 MG/DL (ref 0–1.2)
BUN SERPL-MCNC: 10 MG/DL (ref 6–20)
BUN SERPL-MCNC: 11 MG/DL (ref 6–20)
BUN/CREAT SERPL: 15.9 (ref 7–25)
BUN/CREAT SERPL: 16.4 (ref 7–25)
CALCIUM SPEC-SCNC: 8.7 MG/DL (ref 8.6–10.5)
CALCIUM SPEC-SCNC: 9.1 MG/DL (ref 8.6–10.5)
CHLORIDE SERPL-SCNC: 100 MMOL/L (ref 98–107)
CHLORIDE SERPL-SCNC: 99 MMOL/L (ref 98–107)
CO2 SERPL-SCNC: 23 MMOL/L (ref 22–29)
CO2 SERPL-SCNC: 27 MMOL/L (ref 22–29)
CREAT SERPL-MCNC: 0.63 MG/DL (ref 0.57–1)
CREAT SERPL-MCNC: 0.67 MG/DL (ref 0.57–1)
D DIMER PPP FEU-MCNC: 1.47 MCGFEU/ML (ref 0.01–0.5)
D-LACTATE SERPL-SCNC: 0.5 MMOL/L (ref 0.5–2)
DEPRECATED RDW RBC AUTO: 44.1 FL (ref 37–54)
DEPRECATED RDW RBC AUTO: 46.4 FL (ref 37–54)
EGFRCR SERPLBLD CKD-EPI 2021: 102.1 ML/MIN/1.73
EGFRCR SERPLBLD CKD-EPI 2021: 103.6 ML/MIN/1.73
EOSINOPHIL # BLD AUTO: 0.67 10*3/MM3 (ref 0–0.4)
EOSINOPHIL # BLD AUTO: 0.77 10*3/MM3 (ref 0–0.4)
EOSINOPHIL NFR BLD AUTO: 12 % (ref 0.3–6.2)
EOSINOPHIL NFR BLD AUTO: 9.3 % (ref 0.3–6.2)
ERYTHROCYTE [DISTWIDTH] IN BLOOD BY AUTOMATED COUNT: 13.2 % (ref 12.3–15.4)
ERYTHROCYTE [DISTWIDTH] IN BLOOD BY AUTOMATED COUNT: 13.5 % (ref 12.3–15.4)
FLUAV RNA RESP QL NAA+PROBE: NOT DETECTED
FLUBV RNA RESP QL NAA+PROBE: NOT DETECTED
GLOBULIN UR ELPH-MCNC: 4.1 GM/DL
GLUCOSE SERPL-MCNC: 92 MG/DL (ref 65–99)
GLUCOSE SERPL-MCNC: 97 MG/DL (ref 65–99)
HCT VFR BLD AUTO: 35.7 % (ref 34–46.6)
HCT VFR BLD AUTO: 36.6 % (ref 34–46.6)
HGB BLD-MCNC: 11.4 G/DL (ref 12–15.9)
HGB BLD-MCNC: 11.7 G/DL (ref 12–15.9)
HOLD SPECIMEN: NORMAL
IMM GRANULOCYTES # BLD AUTO: 0.02 10*3/MM3 (ref 0–0.05)
IMM GRANULOCYTES # BLD AUTO: 0.02 10*3/MM3 (ref 0–0.05)
IMM GRANULOCYTES NFR BLD AUTO: 0.3 % (ref 0–0.5)
IMM GRANULOCYTES NFR BLD AUTO: 0.3 % (ref 0–0.5)
LYMPHOCYTES # BLD AUTO: 0.64 10*3/MM3 (ref 0.7–3.1)
LYMPHOCYTES # BLD AUTO: 0.83 10*3/MM3 (ref 0.7–3.1)
LYMPHOCYTES NFR BLD AUTO: 12.9 % (ref 19.6–45.3)
LYMPHOCYTES NFR BLD AUTO: 8.9 % (ref 19.6–45.3)
MAXIMAL PREDICTED HEART RATE: 163 BPM
MCH RBC QN AUTO: 29.2 PG (ref 26.6–33)
MCH RBC QN AUTO: 29.8 PG (ref 26.6–33)
MCHC RBC AUTO-ENTMCNC: 31.1 G/DL (ref 31.5–35.7)
MCHC RBC AUTO-ENTMCNC: 32.8 G/DL (ref 31.5–35.7)
MCV RBC AUTO: 91.1 FL (ref 79–97)
MCV RBC AUTO: 93.8 FL (ref 79–97)
MONOCYTES # BLD AUTO: 0.34 10*3/MM3 (ref 0.1–0.9)
MONOCYTES # BLD AUTO: 0.37 10*3/MM3 (ref 0.1–0.9)
MONOCYTES NFR BLD AUTO: 5.1 % (ref 5–12)
MONOCYTES NFR BLD AUTO: 5.3 % (ref 5–12)
MRSA DNA SPEC QL NAA+PROBE: NEGATIVE
NEUTROPHILS NFR BLD AUTO: 4.41 10*3/MM3 (ref 1.7–7)
NEUTROPHILS NFR BLD AUTO: 5.46 10*3/MM3 (ref 1.7–7)
NEUTROPHILS NFR BLD AUTO: 68.7 % (ref 42.7–76)
NEUTROPHILS NFR BLD AUTO: 75.7 % (ref 42.7–76)
NRBC BLD AUTO-RTO: 0 /100 WBC (ref 0–0.2)
NRBC BLD AUTO-RTO: 0 /100 WBC (ref 0–0.2)
NT-PROBNP SERPL-MCNC: 794.1 PG/ML (ref 0–900)
PLAT MORPH BLD: NORMAL
PLATELET # BLD AUTO: 269 10*3/MM3 (ref 140–450)
PLATELET # BLD AUTO: 339 10*3/MM3 (ref 140–450)
PMV BLD AUTO: 8.8 FL (ref 6–12)
PMV BLD AUTO: 9.9 FL (ref 6–12)
POTASSIUM SERPL-SCNC: 3.5 MMOL/L (ref 3.5–5.2)
POTASSIUM SERPL-SCNC: 4.3 MMOL/L (ref 3.5–5.2)
PROCALCITONIN SERPL-MCNC: 0.08 NG/ML (ref 0–0.25)
PROT SERPL-MCNC: 6.9 G/DL (ref 6–8.5)
RBC # BLD AUTO: 3.9 10*6/MM3 (ref 3.77–5.28)
RBC # BLD AUTO: 3.92 10*6/MM3 (ref 3.77–5.28)
RBC MORPH BLD: NORMAL
SARS-COV-2 RNA RESP QL NAA+PROBE: NOT DETECTED
SODIUM SERPL-SCNC: 135 MMOL/L (ref 136–145)
SODIUM SERPL-SCNC: 135 MMOL/L (ref 136–145)
STRESS TARGET HR: 139 BPM
TROPONIN T SERPL-MCNC: <0.01 NG/ML (ref 0–0.03)
WBC MORPH BLD: NORMAL
WBC NRBC COR # BLD: 6.42 10*3/MM3 (ref 3.4–10.8)
WBC NRBC COR # BLD: 7.21 10*3/MM3 (ref 3.4–10.8)
WHOLE BLOOD HOLD COAG: NORMAL
WHOLE BLOOD HOLD SPECIMEN: NORMAL

## 2022-06-03 PROCEDURE — 94640 AIRWAY INHALATION TREATMENT: CPT

## 2022-06-03 PROCEDURE — 25010000002 PIPERACILLIN SOD-TAZOBACTAM PER 1 G: Performed by: EMERGENCY MEDICINE

## 2022-06-03 PROCEDURE — 93005 ELECTROCARDIOGRAM TRACING: CPT | Performed by: EMERGENCY MEDICINE

## 2022-06-03 PROCEDURE — 71045 X-RAY EXAM CHEST 1 VIEW: CPT

## 2022-06-03 PROCEDURE — 85025 COMPLETE CBC W/AUTO DIFF WBC: CPT | Performed by: NURSE PRACTITIONER

## 2022-06-03 PROCEDURE — 94799 UNLISTED PULMONARY SVC/PX: CPT

## 2022-06-03 PROCEDURE — 0 IOPAMIDOL PER 1 ML: Performed by: EMERGENCY MEDICINE

## 2022-06-03 PROCEDURE — 94761 N-INVAS EAR/PLS OXIMETRY MLT: CPT

## 2022-06-03 PROCEDURE — 85007 BL SMEAR W/DIFF WBC COUNT: CPT | Performed by: EMERGENCY MEDICINE

## 2022-06-03 PROCEDURE — 25010000002 PIPERACILLIN SOD-TAZOBACTAM PER 1 G: Performed by: NURSE PRACTITIONER

## 2022-06-03 PROCEDURE — 85379 FIBRIN DEGRADATION QUANT: CPT | Performed by: PHYSICIAN ASSISTANT

## 2022-06-03 PROCEDURE — 87040 BLOOD CULTURE FOR BACTERIA: CPT | Performed by: EMERGENCY MEDICINE

## 2022-06-03 PROCEDURE — 99285 EMERGENCY DEPT VISIT HI MDM: CPT

## 2022-06-03 PROCEDURE — 93005 ELECTROCARDIOGRAM TRACING: CPT

## 2022-06-03 PROCEDURE — 84145 PROCALCITONIN (PCT): CPT | Performed by: NURSE PRACTITIONER

## 2022-06-03 PROCEDURE — 25010000002 ENOXAPARIN PER 10 MG

## 2022-06-03 PROCEDURE — 93970 EXTREMITY STUDY: CPT | Performed by: INTERNAL MEDICINE

## 2022-06-03 PROCEDURE — 25010000002 VANCOMYCIN 10 G RECONSTITUTED SOLUTION: Performed by: EMERGENCY MEDICINE

## 2022-06-03 PROCEDURE — 84484 ASSAY OF TROPONIN QUANT: CPT | Performed by: EMERGENCY MEDICINE

## 2022-06-03 PROCEDURE — 71275 CT ANGIOGRAPHY CHEST: CPT

## 2022-06-03 PROCEDURE — 94664 DEMO&/EVAL PT USE INHALER: CPT

## 2022-06-03 PROCEDURE — 25010000002 ONDANSETRON PER 1 MG: Performed by: EMERGENCY MEDICINE

## 2022-06-03 PROCEDURE — 99223 1ST HOSP IP/OBS HIGH 75: CPT | Performed by: INTERNAL MEDICINE

## 2022-06-03 PROCEDURE — 92610 EVALUATE SWALLOWING FUNCTION: CPT

## 2022-06-03 PROCEDURE — 80053 COMPREHEN METABOLIC PANEL: CPT | Performed by: EMERGENCY MEDICINE

## 2022-06-03 PROCEDURE — 85025 COMPLETE CBC W/AUTO DIFF WBC: CPT | Performed by: EMERGENCY MEDICINE

## 2022-06-03 PROCEDURE — 83605 ASSAY OF LACTIC ACID: CPT | Performed by: EMERGENCY MEDICINE

## 2022-06-03 PROCEDURE — 25010000002 MORPHINE PER 10 MG: Performed by: EMERGENCY MEDICINE

## 2022-06-03 PROCEDURE — 87641 MR-STAPH DNA AMP PROBE: CPT | Performed by: NURSE PRACTITIONER

## 2022-06-03 PROCEDURE — 83880 ASSAY OF NATRIURETIC PEPTIDE: CPT | Performed by: EMERGENCY MEDICINE

## 2022-06-03 PROCEDURE — 93970 EXTREMITY STUDY: CPT

## 2022-06-03 PROCEDURE — 87636 SARSCOV2 & INF A&B AMP PRB: CPT | Performed by: EMERGENCY MEDICINE

## 2022-06-03 RX ORDER — ACETAMINOPHEN 650 MG/1
650 SUPPOSITORY RECTAL EVERY 4 HOURS PRN
Status: DISCONTINUED | OUTPATIENT
Start: 2022-06-03 | End: 2022-06-05 | Stop reason: HOSPADM

## 2022-06-03 RX ORDER — SODIUM CHLORIDE 0.9 % (FLUSH) 0.9 %
10 SYRINGE (ML) INJECTION AS NEEDED
Status: DISCONTINUED | OUTPATIENT
Start: 2022-06-03 | End: 2022-06-05 | Stop reason: HOSPADM

## 2022-06-03 RX ORDER — MORPHINE SULFATE 4 MG/ML
4 INJECTION, SOLUTION INTRAMUSCULAR; INTRAVENOUS ONCE
Status: COMPLETED | OUTPATIENT
Start: 2022-06-03 | End: 2022-06-03

## 2022-06-03 RX ORDER — IPRATROPIUM BROMIDE AND ALBUTEROL SULFATE 2.5; .5 MG/3ML; MG/3ML
3 SOLUTION RESPIRATORY (INHALATION) EVERY 4 HOURS PRN
Status: DISCONTINUED | OUTPATIENT
Start: 2022-06-03 | End: 2022-06-05 | Stop reason: HOSPADM

## 2022-06-03 RX ORDER — ONDANSETRON 2 MG/ML
4 INJECTION INTRAMUSCULAR; INTRAVENOUS ONCE
Status: COMPLETED | OUTPATIENT
Start: 2022-06-03 | End: 2022-06-03

## 2022-06-03 RX ORDER — DULOXETIN HYDROCHLORIDE 60 MG/1
60 CAPSULE, DELAYED RELEASE ORAL DAILY
Status: DISCONTINUED | OUTPATIENT
Start: 2022-06-03 | End: 2022-06-05 | Stop reason: HOSPADM

## 2022-06-03 RX ORDER — ACETAMINOPHEN 160 MG/5ML
650 SOLUTION ORAL EVERY 4 HOURS PRN
Status: DISCONTINUED | OUTPATIENT
Start: 2022-06-03 | End: 2022-06-05 | Stop reason: HOSPADM

## 2022-06-03 RX ORDER — ASPIRIN 81 MG/1
81 TABLET ORAL DAILY
Status: DISCONTINUED | OUTPATIENT
Start: 2022-06-03 | End: 2022-06-05 | Stop reason: HOSPADM

## 2022-06-03 RX ORDER — NICOTINE 21 MG/24HR
1 PATCH, TRANSDERMAL 24 HOURS TRANSDERMAL
Status: DISCONTINUED | OUTPATIENT
Start: 2022-06-03 | End: 2022-06-05 | Stop reason: HOSPADM

## 2022-06-03 RX ORDER — SODIUM CHLORIDE 9 MG/ML
75 INJECTION, SOLUTION INTRAVENOUS CONTINUOUS
Status: ACTIVE | OUTPATIENT
Start: 2022-06-03 | End: 2022-06-03

## 2022-06-03 RX ORDER — CLOPIDOGREL BISULFATE 75 MG/1
75 TABLET ORAL DAILY
Status: DISCONTINUED | OUTPATIENT
Start: 2022-06-03 | End: 2022-06-05 | Stop reason: HOSPADM

## 2022-06-03 RX ORDER — ROSUVASTATIN CALCIUM 20 MG/1
20 TABLET, COATED ORAL DAILY
Status: DISCONTINUED | OUTPATIENT
Start: 2022-06-03 | End: 2022-06-05 | Stop reason: HOSPADM

## 2022-06-03 RX ORDER — ENOXAPARIN SODIUM 100 MG/ML
40 INJECTION SUBCUTANEOUS EVERY 24 HOURS
Status: DISCONTINUED | OUTPATIENT
Start: 2022-06-04 | End: 2022-06-05 | Stop reason: HOSPADM

## 2022-06-03 RX ORDER — ENOXAPARIN SODIUM 100 MG/ML
1 INJECTION SUBCUTANEOUS EVERY 12 HOURS SCHEDULED
Status: DISCONTINUED | OUTPATIENT
Start: 2022-06-03 | End: 2022-06-03

## 2022-06-03 RX ORDER — BRIMONIDINE TARTRATE 2 MG/ML
1 SOLUTION/ DROPS OPHTHALMIC 2 TIMES DAILY
Status: DISCONTINUED | OUTPATIENT
Start: 2022-06-03 | End: 2022-06-05 | Stop reason: HOSPADM

## 2022-06-03 RX ORDER — ACETAMINOPHEN 325 MG/1
650 TABLET ORAL EVERY 4 HOURS PRN
Status: DISCONTINUED | OUTPATIENT
Start: 2022-06-03 | End: 2022-06-05 | Stop reason: HOSPADM

## 2022-06-03 RX ORDER — FOLIC ACID 1 MG/1
1 TABLET ORAL DAILY
Status: DISCONTINUED | OUTPATIENT
Start: 2022-06-03 | End: 2022-06-05 | Stop reason: HOSPADM

## 2022-06-03 RX ORDER — IPRATROPIUM BROMIDE AND ALBUTEROL SULFATE 2.5; .5 MG/3ML; MG/3ML
3 SOLUTION RESPIRATORY (INHALATION)
Status: DISCONTINUED | OUTPATIENT
Start: 2022-06-03 | End: 2022-06-04 | Stop reason: CLARIF

## 2022-06-03 RX ORDER — VANCOMYCIN HYDROCHLORIDE 1 G/200ML
1000 INJECTION, SOLUTION INTRAVENOUS EVERY 12 HOURS
Status: DISCONTINUED | OUTPATIENT
Start: 2022-06-03 | End: 2022-06-03

## 2022-06-03 RX ADMIN — TAZOBACTAM SODIUM AND PIPERACILLIN SODIUM 4.5 G: 500; 4 INJECTION, SOLUTION INTRAVENOUS at 04:23

## 2022-06-03 RX ADMIN — ONDANSETRON 4 MG: 2 INJECTION INTRAMUSCULAR; INTRAVENOUS at 02:15

## 2022-06-03 RX ADMIN — ENOXAPARIN SODIUM 70 MG: 80 INJECTION SUBCUTANEOUS at 04:58

## 2022-06-03 RX ADMIN — IPRATROPIUM BROMIDE AND ALBUTEROL SULFATE 3 ML: 2.5; .5 SOLUTION RESPIRATORY (INHALATION) at 15:41

## 2022-06-03 RX ADMIN — CLOPIDOGREL BISULFATE 75 MG: 75 TABLET ORAL at 10:19

## 2022-06-03 RX ADMIN — DULOXETINE HYDROCHLORIDE 60 MG: 60 CAPSULE, DELAYED RELEASE ORAL at 10:19

## 2022-06-03 RX ADMIN — ASPIRIN 81 MG: 81 TABLET, COATED ORAL at 10:19

## 2022-06-03 RX ADMIN — FOLIC ACID 1 MG: 1 TABLET ORAL at 10:19

## 2022-06-03 RX ADMIN — Medication 10 ML: at 10:20

## 2022-06-03 RX ADMIN — IOPAMIDOL 80 ML: 755 INJECTION, SOLUTION INTRAVENOUS at 02:32

## 2022-06-03 RX ADMIN — MORPHINE SULFATE 4 MG: 4 INJECTION, SOLUTION INTRAMUSCULAR; INTRAVENOUS at 02:14

## 2022-06-03 RX ADMIN — ROSUVASTATIN 20 MG: 20 TABLET, FILM COATED ORAL at 10:19

## 2022-06-03 RX ADMIN — SODIUM CHLORIDE 75 ML/HR: 9 INJECTION, SOLUTION INTRAVENOUS at 10:19

## 2022-06-03 RX ADMIN — BRIMONIDINE TARTRATE 1 DROP: 2 SOLUTION/ DROPS OPHTHALMIC at 10:20

## 2022-06-03 RX ADMIN — ACETAMINOPHEN 325MG 650 MG: 325 TABLET ORAL at 18:04

## 2022-06-03 RX ADMIN — VANCOMYCIN HYDROCHLORIDE 1250 MG: 10 INJECTION, POWDER, LYOPHILIZED, FOR SOLUTION INTRAVENOUS at 04:58

## 2022-06-03 RX ADMIN — TAZOBACTAM SODIUM AND PIPERACILLIN SODIUM 3.38 G: 375; 3 INJECTION, SOLUTION INTRAVENOUS at 12:11

## 2022-06-03 RX ADMIN — TAZOBACTAM SODIUM AND PIPERACILLIN SODIUM 3.38 G: 375; 3 INJECTION, SOLUTION INTRAVENOUS at 18:04

## 2022-06-03 RX ADMIN — IPRATROPIUM BROMIDE AND ALBUTEROL SULFATE 3 ML: 2.5; .5 SOLUTION RESPIRATORY (INHALATION) at 11:40

## 2022-06-03 RX ADMIN — IPRATROPIUM BROMIDE AND ALBUTEROL SULFATE 3 ML: 2.5; .5 SOLUTION RESPIRATORY (INHALATION) at 08:34

## 2022-06-03 RX ADMIN — BRIMONIDINE TARTRATE 1 DROP: 2 SOLUTION/ DROPS OPHTHALMIC at 22:39

## 2022-06-03 NOTE — PROGRESS NOTES
"Pharmacy Consult-Vancomycin Dosing  Regine Alfred is a  57 y.o. female receiving vancomycin therapy.     Indication: Pneumonia; fever  Consulting Provider: Aleksandra RUBIO  ID Consult:     Goal AUC: 400 - 600 mg/L*hr    Current Antimicrobial Therapy  Anti-Infectives (From admission, onward)      Ordered     Dose/Rate Route Frequency Start Stop    06/03/22 0648  vancomycin (VANCOCIN) 1000 mg/200 mL dextrose 5% IVPB        Ordering Provider: Rishi Sampson, [facility]    1,000 mg Intravenous Every 12 Hours 06/03/22 1800 06/10/22 1759    06/03/22 0640  piperacillin-tazobactam (ZOSYN) 3.375 g in iso-osmotic dextrose 50 ml (premix)        Ordering Provider: Aleksandra Christopher APRN    3.375 g  over 4 Hours Intravenous Every 8 Hours Scheduled 06/03/22 1030 06/07/22 0959    06/03/22 0640  Pharmacy to dose vancomycin        Ordering Provider: Aleksandra Christopher APRN   \"And\" Linked Group Details     Does not apply Continuous PRN 06/03/22 0640 06/07/22 0639    06/03/22 0331  vancomycin 1250 mg/250 mL 0.9% NS IVPB ([facility])        Ordering Provider: Brandon Thompson MD    20 mg/kg × 68 kg  over 75 Minutes Intravenous Once 06/03/22 0333 06/03/22 0613    06/03/22 0331  piperacillin-tazobactam (ZOSYN) 4.5 g in iso-osmotic dextrose 100 mL IVPB (premix)        Ordering Provider: Brandon Thompson MD    4.5 g  over 30 Minutes Intravenous Once 06/03/22 0333 06/03/22 0458            Allergies  Allergies as of 06/03/2022 - Reviewed 06/03/2022   Allergen Reaction Noted    Novocain [procaine] Itching 02/06/2020       Labs    Results from last 7 days   Lab Units 06/03/22  0019   BUN mg/dL 11   CREATININE mg/dL 0.67       Results from last 7 days   Lab Units 06/03/22  0019   WBC 10*3/mm3 7.21       Evaluation of Dosing     Last Dose Received in the ED/Outside Facility: Vancomycin 1250 mg IV x 1 (6/3/22 04:59)  Is Patient on Dialysis or Renal Replacement:     Ht - 177.8 cm (70\")  Wt - 68 kg (150 lb)    Estimated Creatinine Clearance: 99.4 " mL/min (by C-G formula based on SCr of 0.67 mg/dL).    Intake & Output (last 3 days)         05/31 0701 06/01 0700 06/01 0701 06/02 0700 06/02 0701 06/03 0700    IV Piggyback   100    Total Intake(mL/kg)   100 (1.5)    Net   +100                   Microbiology and Radiology  Microbiology Results (last 10 days)       Procedure Component Value - Date/Time    COVID PRE-OP / PRE-PROCEDURE SCREENING ORDER (NO ISOLATION) - Swab, Nasopharynx [[id number]]  (Normal) Collected: 06/03/22 0510    Lab Status: Final result Specimen: Swab from Nasopharynx Updated: 06/03/22 0551    Narrative:      The following orders were created for panel order COVID PRE-OP / PRE-PROCEDURE SCREENING ORDER (NO ISOLATION) - Swab, Nasopharynx.  Procedure                               Abnormality         Status                     ---------                               -----------         ------                     COVID-19 and FLU A/B PCR...[[id number]]  Normal              Final result                 Please view results for these tests on the individual orders.    COVID-19 and FLU A/B PCR - Swab, Nasopharynx [[id number]]  (Normal) Collected: 06/03/22 0510    Lab Status: Final result Specimen: Swab from Nasopharynx Updated: 06/03/22 0551     COVID19 Not Detected     Influenza A PCR Not Detected     Influenza B PCR Not Detected    Narrative:      Fact sheet for providers: https://www.fda.gov/media/521499/download    Fact sheet for patients: https://www.fda.gov/media/401049/download    Test performed by PCR.            Reported Vancomycin Levels                         [facility] AUC Calculation:    Current AUC: 0 mg/L*hr    Predicted Steady State AUC on Current Dose: 512 mg/L*hr (1000 mg Q12H)  _________________________________    Predicted Steady State AUC on New Dose:   mg/L*hr    Assessment/Plan:   Vancomycin 1250 mg IV x 1 (given), then Vancomycin 1000 mg IV Q12H (15 mg/kg/dose)  Vancomycin level before 4th dose (18:00 dose on Saturday  6/4/22)  MRSA Screen PCR ordered  Pharmacy to follow    Rishi Sampson [facility]  6/3/22 07:00

## 2022-06-03 NOTE — PLAN OF CARE
Goal Outcome Evaluation:               Pt admitted for SOA sepsis and pneumonia. Pt assessed and sleeping at this time. No voiced complaints. Will continue to monitor        Problem: Adult Inpatient Plan of Care  Goal: Absence of Hospital-Acquired Illness or Injury  Intervention: Identify and Manage Fall Risk  Recent Flowsheet Documentation  Taken 6/3/2022 0600 by Van Matthews RN  Safety Promotion/Fall Prevention:   activity supervised   assistive device/personal items within reach   clutter free environment maintained   fall prevention program maintained   gait belt   lighting adjusted   mobility aid in reach   nonskid shoes/slippers when out of bed   room organization consistent   safety round/check completed  Intervention: Prevent Skin Injury  Recent Flowsheet Documentation  Taken 6/3/2022 0600 by Van Matthews RN  Skin Protection:   adhesive use limited   tubing/devices free from skin contact  Intervention: Prevent and Manage VTE (Venous Thromboembolism) Risk  Recent Flowsheet Documentation  Taken 6/3/2022 0600 by Van Matthews RN  Activity Management: activity adjusted per tolerance  Intervention: Prevent Infection  Recent Flowsheet Documentation  Taken 6/3/2022 0600 by Van Matthews RN  Infection Prevention:   environmental surveillance performed   equipment surfaces disinfected   hand hygiene promoted   personal protective equipment utilized   rest/sleep promoted   single patient room provided   visitors restricted/screened  Goal: Optimal Comfort and Wellbeing  Intervention: Monitor Pain and Promote Comfort  Recent Flowsheet Documentation  Taken 6/3/2022 0600 by Van Matthews RN  Pain Management Interventions: quiet environment facilitated  Intervention: Provide Person-Centered Care  Recent Flowsheet Documentation  Taken 6/3/2022 0600 by Van Matthews RN  Trust Relationship/Rapport: care explained  Goal: Readiness for Transition of Care  Intervention: Mutually Develop Transition Plan  Recent Flowsheet  Documentation  Taken 6/3/2022 0538 by Van Matthews RN  Transportation Anticipated: car, drives self  Patient/Family Anticipated Services at Transition: none  Patient/Family Anticipates Transition to: home with family  Taken 6/3/2022 0537 by Van Matthews RN  Equipment Currently Used at Home: none     Problem: Asthma Comorbidity  Goal: Maintenance of Asthma Control  Intervention: Maintain Asthma Symptom Control  Recent Flowsheet Documentation  Taken 6/3/2022 0600 by Van Matthews RN  Medication Review/Management: medications reviewed     Problem: Behavioral Health Comorbidity  Goal: Maintenance of Behavioral Health Symptom Control  Intervention: Maintain Behavioral Health Symptom Control  Recent Flowsheet Documentation  Taken 6/3/2022 0600 by Van Matthews RN  Medication Review/Management: medications reviewed     Problem: COPD (Chronic Obstructive Pulmonary Disease) Comorbidity  Goal: Maintenance of COPD Symptom Control  Intervention: Maintain COPD-Symptom Control  Recent Flowsheet Documentation  Taken 6/3/2022 0600 by Van Matthews RN  Supportive Measures: active listening utilized  Medication Review/Management: medications reviewed     Problem: Heart Failure Comorbidity  Goal: Maintenance of Heart Failure Symptom Control  Intervention: Maintain Heart Failure-Management  Recent Flowsheet Documentation  Taken 6/3/2022 0600 by Van Matthews RN  Medication Review/Management: medications reviewed     Problem: Hypertension Comorbidity  Goal: Blood Pressure in Desired Range  Intervention: Maintain Blood Pressure Management  Recent Flowsheet Documentation  Taken 6/3/2022 0600 by Van Matthews RN  Medication Review/Management: medications reviewed     Problem: Osteoarthritis Comorbidity  Goal: Maintenance of Osteoarthritis Symptom Control  Intervention: Maintain Osteoarthritis Symptom Control  Recent Flowsheet Documentation  Taken 6/3/2022 0600 by Van Matthews RN  Activity Management: activity adjusted per tolerance  Medication  Review/Management: medications reviewed     Problem: Pain Chronic (Persistent) (Comorbidity Management)  Goal: Acceptable Pain Control and Functional Ability  Intervention: Manage Persistent Pain  Recent Flowsheet Documentation  Taken 6/3/2022 0600 by Van Matthews RN  Sleep/Rest Enhancement: awakenings minimized  Medication Review/Management: medications reviewed  Intervention: Develop Pain Management Plan  Recent Flowsheet Documentation  Taken 6/3/2022 0600 by aVn Matthews RN  Pain Management Interventions: quiet environment facilitated  Intervention: Optimize Psychosocial Wellbeing  Recent Flowsheet Documentation  Taken 6/3/2022 0600 by Van Matthews RN  Supportive Measures: active listening utilized  Diversional Activities: television  Family/Support System Care: caregiver stress acknowledged

## 2022-06-03 NOTE — PLAN OF CARE
Problem: Adult Inpatient Plan of Care  Goal: Plan of Care Review  Outcome: Ongoing, Progressing  Flowsheets (Taken 6/3/2022 0362)  Plan of Care Reviewed With: patient   Goal Outcome Evaluation:  Plan of Care Reviewed With: patient            SLP evaluation completed. Will sign-off. Please see note for further details and recommendations.

## 2022-06-03 NOTE — ED PROVIDER NOTES
[place]    EMERGENCY DEPARTMENT ENCOUNTER      Pt Name: Regine Alfred  MRN: [id number]  YOB: 1965  Date of evaluation: 6/3/2022  Provider: Brandon Thompson MD    CHIEF COMPLAINT       Chief Complaint   Patient presents with   • Shortness of Breath         HISTORY OF PRESENT ILLNESS  (Location/Symptom, Timing/Onset, Context/Setting, Quality, Duration, Modifying Factors, Severity.)   Regine Alfred is a 57 y.o. female who presents to the emergency department with 2 days of progressively worsening moderate severity shortness of breath that is worse with exertion.  Patient admitted to [facility] 3 weeks ago for COVID and bronchitis.  Patient is a smoker and has a history of COPD.  She has no chest pain, fever, or chills but does have cough.        Nursing notes were reviewed.    REVIEW OF SYSTEMS    (2-9 systems for level 4, 10 or more for level 5)   ROS:  General:  No fevers, no chills, no weakness  Cardiovascular:  No chest pain, no palpitations  Respiratory: + Shortness of breath, cough  Gastrointestinal:  No pain, no nausea, no vomiting, no diarrhea  Musculoskeletal:  No muscle pain, no joint pain  Skin:  No rash  Neurologic:  No speech problems, no headache, no extremity numbness, no extremity tingling, no extremity weakness  Psychiatric:  No anxiety  Genitourinary:  No dysuria, no hematuria    Except as noted above the remainder of the review of systems was reviewed and negative.       PAST MEDICAL HISTORY     Past Medical History:   Diagnosis Date   • Anxiety    • Arthritis    • Colon polyp    • Easy bruising    • Glaucoma    • H/O blood clots    • Heart attack (CMS/HCC)    • History of chicken pox          SURGICAL HISTORY       Past Surgical History:   Procedure Laterality Date   • COLONOSCOPY     • CORONARY STENT PLACEMENT           CURRENT MEDICATIONS       Current Facility-Administered Medications:   •  acetaminophen (TYLENOL) tablet 650 mg, 650 mg, Oral, Q4H PRN, 650 mg at 06/05/22 0217  **OR** acetaminophen (TYLENOL) 160 MG/5ML solution 650 mg, 650 mg, Oral, Q4H PRN **OR** acetaminophen (TYLENOL) suppository 650 mg, 650 mg, Rectal, Q4H PRN, Candi, Aleksandra, APRN  •  albuterol sulfate HFA (PROVENTIL HFA;VENTOLIN HFA;PROAIR HFA) inhaler 2 puff, 2 puff, Inhalation, 4x Daily - RT, 2 puff at 06/04/22 2015 **AND** ipratropium (ATROVENT HFA) inhaler 2 puff, 2 puff, Inhalation, 4x Daily - RT, Tone George PharmD, 2 puff at 06/04/22 2015  •  aspirin EC tablet 81 mg, 81 mg, Oral, Daily, Candi, Aleksandra, APRN, 81 mg at 06/04/22 0927  •  brimonidine (ALPHAGAN) 0.2 % ophthalmic solution 1 drop, 1 drop, Both Eyes, BID, Candi, Aleksandra, APRN, 1 drop at 06/04/22 2128  •  [START ON 6/6/2022] cholecalciferol (VITAMIN D3) capsule 50,000 Units, 50,000 Units, Oral, Q7 Days, Candi, Aleksandra, APRN  •  clopidogrel (PLAVIX) tablet 75 mg, 75 mg, Oral, Daily, Candi, Aleksandra, APRN, 75 mg at 06/04/22 0927  •  DULoxetine (CYMBALTA) DR capsule 60 mg, 60 mg, Oral, Daily, Candi, Aleksandra, APRN, 60 mg at 06/04/22 0926  •  Enoxaparin Sodium (LOVENOX) syringe 40 mg, 40 mg, Subcutaneous, Q24H, Erinn Rodriguez MD, 40 mg at 06/04/22 0926  •  folic acid (FOLVITE) tablet 1 mg, 1 mg, Oral, Daily, Candi, Aleksandra, APRN, 1 mg at 06/04/22 0927  •  ipratropium-albuterol (DUO-NEB) nebulizer solution 3 mL, 3 mL, Nebulization, Q4H PRN, Candi, Aleksandra, APRN  •  nicotine (NICODERM CQ) 14 MG/24HR patch 1 patch, 1 patch, Transdermal, Q24H, Candi, Aleksandra, APRN  •  piperacillin-tazobactam (ZOSYN) 3.375 g in iso-osmotic dextrose 50 ml (premix), 3.375 g, Intravenous, Q8H, Candi, Aleksandra, APRN, 3.375 g at 06/05/22 0214  •  potassium chloride (KLOR-CON) packet 40 mEq, 40 mEq, Oral, PRN, Ellie Roldan MD  •  potassium chloride (MICRO-K) CR capsule 40 mEq, 40 mEq, Oral, PRN, Ellie Roldan MD, 40 mEq at 06/04/22 2234  •  rosuvastatin (CRESTOR) tablet 20 mg, 20 mg, Oral, Daily, Candi, Aleksandra, APRN, 20 mg at 06/04/22 0996  •   sodium chloride 0.9 % flush 10 mL, 10 mL, Intravenous, PRN, Brandon Thompson MD, 10 mL at 06/04/22 2128    ALLERGIES     Novocain [procaine]    FAMILY HISTORY       Family History   Problem Relation Age of Onset   • Heart disease Mother    • Heart disease Father    • Heart disease Sister    • Heart attack Sister    • Heart disease Brother    • Heart disease Brother    • Colon polyps Neg Hx    • Colon cancer Neg Hx           SOCIAL HISTORY       Social History     Socioeconomic History   • Marital status:    Tobacco Use   • Smoking status: Current Every Day Smoker     Packs/day: 1.00     Types: Cigarettes   • Smokeless tobacco: Never Used   Vaping Use   • Vaping Use: Never used   Substance and Sexual Activity   • Alcohol use: Not Currently   • Drug use: Not Currently   • Sexual activity: Defer         PHYSICAL EXAM    (up to 7 for level 4, 8 or more for level 5)     Vitals:    06/05/22 0400 06/05/22 0405 06/05/22 0409 06/05/22 0516   BP:    149/94   BP Location:    Right arm   Patient Position:    Lying   Pulse: 78 86 87 75   Resp:    18   Temp:    97.8 °F (36.6 °C)   TempSrc:    Oral   SpO2: 90% (!) 88% 96%    Weight:    73.8 kg (162 lb 12.8 oz)   Height:           Physical Exam  General: Awake, alert, no acute distress.  HEENT: Conjunctivae normal.  Neck: Trachea midline.  Cardiac: Tachycardic, regular rhythm, no murmurs, rubs, or gallops  Lungs: Tachypneic, diffusely coarse  Chest wall: There is no tenderness to palpation over the chest wall or over ribs  Abdomen: Abdomen is soft, nontender, nondistended. There are no firm or pulsatile masses, no rebound rigidity or guarding.   Musculoskeletal: No deformity.  Neuro: Alert and oriented x 4.  Dermatology: Skin is warm and dry  Psych: Mentation is grossly normal, cognition is grossly normal. Affect is appropriate.        DIAGNOSTIC RESULTS     EKG: All EKGs are interpreted by the Emergency Department Physician who either signs or Co-signs this chart in the  absence of a cardiologist.    ECG 12 Lead   Final Result   Test Reason : SOA Protocol   Blood Pressure :   */*   mmHG   Vent. Rate : 114 BPM     Atrial Rate : 114 BPM      P-R Int : 138 ms          QRS Dur :  70 ms       QT Int : 320 ms       P-R-T Axes :  78  88  83 degrees      QTc Int : 441 ms      ** Poor data quality, interpretation may be adversely affected   Sinus tachycardia   Otherwise normal ECG   No previous ECGs available   Confirmed by JENNIFER MULLINS ([id number]) on 6/5/2022 3:36:41 AM      Referred By: EDMD           Confirmed By: JENNIFER MULLINS          RADIOLOGY:   Non-plain film images such as CT, Ultrasound and MRI are read by the radiologist. Plain radiographic images are visualized and preliminarily interpreted by the emergency physician with the below findings:      [x] Radiologist's Report Reviewed:  CT Angiogram Chest   Final Result      No CTA evidence of pulmonary embolism or acute aortic pathology.      Heterogeneous opacity in the posterior medial right lung base, concerning for pneumonia. An evolving infarct could potentially have this appearance as well, though no definite pulmonary embolism seen at this site. A follow-up chest CT is recommended in 2    months to ensure complete clearance of this and to exclude an underlying lesion.      Relatively extensive atherosclerosis including coronary calcifications.      Small sliding-type hiatal hernia.      RECOMMENDATION:      Follow-up chest CT in 2 months as above.      Electronically signed by:  Tera Angel M.D.     6/3/2022 12:58 AM Mountain Time      XR Chest 1 View   Final Result      1.  No acute cardiopulmonary process.               Electronically signed by:  Bridgett Nina M.D.     6/2/2022 11:59 PM Mountain Time            ED BEDSIDE ULTRASOUND:   Performed by ED Physician - none    LABS:    I have reviewed and interpreted all of the currently available lab results from this visit (if applicable):  Results for orders placed or  performed during the hospital encounter of 06/03/22   Blood Culture - Blood, Arm, Right    Specimen: Arm, Right; Blood   Result Value Ref Range    Blood Culture No growth at 2 days    Blood Culture - Blood, Hand, Right    Specimen: Hand, Right; Blood   Result Value Ref Range    Blood Culture No growth at 2 days    COVID-19 and FLU A/B PCR - Swab, Nasopharynx    Specimen: Nasopharynx; Swab   Result Value Ref Range    COVID19 Not Detected Not Detected - Ref. Range    Influenza A PCR Not Detected Not Detected    Influenza B PCR Not Detected Not Detected   MRSA Screen, PCR (Inpatient) - Swab, Nares    Specimen: Nares; Swab   Result Value Ref Range    MRSA PCR Negative Negative   Legionella Antigen, Urine - Urine, Urine, Clean Catch    Specimen: Urine, Clean Catch   Result Value Ref Range    LEGIONELLA ANTIGEN, URINE Negative Negative   S. Pneumo Ag Urine or CSF - Urine, Urine, Clean Catch    Specimen: Urine, Clean Catch   Result Value Ref Range    Strep Pneumo Ag Negative Negative   Comprehensive Metabolic Panel    Specimen: Blood   Result Value Ref Range    Glucose 97 65 - 99 mg/dL    BUN 11 6 - 20 mg/dL    Creatinine 0.67 0.57 - 1.00 mg/dL    Sodium 135 (L) 136 - 145 mmol/L    Potassium 4.3 3.5 - 5.2 mmol/L    Chloride 100 98 - 107 mmol/L    CO2 23.0 22.0 - 29.0 mmol/L    Calcium 9.1 8.6 - 10.5 mg/dL    Total Protein 6.9 6.0 - 8.5 g/dL    Albumin 2.80 (L) 3.50 - 5.20 g/dL    ALT (SGPT) 10 1 - 33 U/L    AST (SGOT) 23 1 - 32 U/L    Alkaline Phosphatase 78 39 - 117 U/L    Total Bilirubin 0.2 0.0 - 1.2 mg/dL    Globulin 4.1 gm/dL    A/G Ratio 0.7 g/dL    BUN/Creatinine Ratio 16.4 7.0 - 25.0    Anion Gap 12.0 5.0 - 15.0 mmol/L    eGFR 102.1 >60.0 mL/min/1.73   BNP    Specimen: Blood   Result Value Ref Range    proBNP 794.1 0.0 - 900.0 pg/mL   Troponin    Specimen: Blood   Result Value Ref Range    Troponin T <0.010 0.000 - 0.030 ng/mL   CBC Auto Differential    Specimen: Blood   Result Value Ref Range    WBC 7.21 3.40 -  10.80 10*3/mm3    RBC 3.90 3.77 - 5.28 10*6/mm3    Hemoglobin 11.4 (L) 12.0 - 15.9 g/dL    Hematocrit 36.6 34.0 - 46.6 %    MCV 93.8 79.0 - 97.0 fL    MCH 29.2 26.6 - 33.0 pg    MCHC 31.1 (L) 31.5 - 35.7 g/dL    RDW 13.5 12.3 - 15.4 %    RDW-SD 46.4 37.0 - 54.0 fl    MPV 9.9 6.0 - 12.0 fL    Platelets 339 140 - 450 10*3/mm3    Neutrophil % 75.7 42.7 - 76.0 %    Lymphocyte % 8.9 (L) 19.6 - 45.3 %    Monocyte % 5.1 5.0 - 12.0 %    Eosinophil % 9.3 (H) 0.3 - 6.2 %    Basophil % 0.7 0.0 - 1.5 %    Immature Grans % 0.3 0.0 - 0.5 %    Neutrophils, Absolute 5.46 1.70 - 7.00 10*3/mm3    Lymphocytes, Absolute 0.64 (L) 0.70 - 3.10 10*3/mm3    Monocytes, Absolute 0.37 0.10 - 0.90 10*3/mm3    Eosinophils, Absolute 0.67 (H) 0.00 - 0.40 10*3/mm3    Basophils, Absolute 0.05 0.00 - 0.20 10*3/mm3    Immature Grans, Absolute 0.02 0.00 - 0.05 10*3/mm3    nRBC 0.0 0.0 - 0.2 /100 WBC   D-dimer, Quantitative    Specimen: Blood   Result Value Ref Range    D-Dimer, Quantitative 1.47 (H) 0.01 - 0.50 MCGFEU/mL   Scan Slide    Specimen: Blood   Result Value Ref Range    RBC Morphology Normal Normal    WBC Morphology Normal Normal    Platelet Morphology Normal Normal   Lactic Acid, Plasma    Specimen: Blood   Result Value Ref Range    Lactate 0.5 0.5 - 2.0 mmol/L   Procalcitonin    Specimen: Blood   Result Value Ref Range    Procalcitonin 0.08 0.00 - 0.25 ng/mL   CBC Auto Differential    Specimen: Blood   Result Value Ref Range    WBC 6.42 3.40 - 10.80 10*3/mm3    RBC 3.92 3.77 - 5.28 10*6/mm3    Hemoglobin 11.7 (L) 12.0 - 15.9 g/dL    Hematocrit 35.7 34.0 - 46.6 %    MCV 91.1 79.0 - 97.0 fL    MCH 29.8 26.6 - 33.0 pg    MCHC 32.8 31.5 - 35.7 g/dL    RDW 13.2 12.3 - 15.4 %    RDW-SD 44.1 37.0 - 54.0 fl    MPV 8.8 6.0 - 12.0 fL    Platelets 269 140 - 450 10*3/mm3    Neutrophil % 68.7 42.7 - 76.0 %    Lymphocyte % 12.9 (L) 19.6 - 45.3 %    Monocyte % 5.3 5.0 - 12.0 %    Eosinophil % 12.0 (H) 0.3 - 6.2 %    Basophil % 0.8 0.0 - 1.5 %    Immature  Grans % 0.3 0.0 - 0.5 %    Neutrophils, Absolute 4.41 1.70 - 7.00 10*3/mm3    Lymphocytes, Absolute 0.83 0.70 - 3.10 10*3/mm3    Monocytes, Absolute 0.34 0.10 - 0.90 10*3/mm3    Eosinophils, Absolute 0.77 (H) 0.00 - 0.40 10*3/mm3    Basophils, Absolute 0.05 0.00 - 0.20 10*3/mm3    Immature Grans, Absolute 0.02 0.00 - 0.05 10*3/mm3    nRBC 0.0 0.0 - 0.2 /100 WBC   Basic Metabolic Panel    Specimen: Blood   Result Value Ref Range    Glucose 92 65 - 99 mg/dL    BUN 10 6 - 20 mg/dL    Creatinine 0.63 0.57 - 1.00 mg/dL    Sodium 135 (L) 136 - 145 mmol/L    Potassium 3.5 3.5 - 5.2 mmol/L    Chloride 99 98 - 107 mmol/L    CO2 27.0 22.0 - 29.0 mmol/L    Calcium 8.7 8.6 - 10.5 mg/dL    BUN/Creatinine Ratio 15.9 7.0 - 25.0    Anion Gap 9.0 5.0 - 15.0 mmol/L    eGFR 103.6 >60.0 mL/min/1.73   Basic Metabolic Panel    Specimen: Blood   Result Value Ref Range    Glucose 88 65 - 99 mg/dL    BUN 8 6 - 20 mg/dL    Creatinine 0.59 0.57 - 1.00 mg/dL    Sodium 137 136 - 145 mmol/L    Potassium 3.3 (L) 3.5 - 5.2 mmol/L    Chloride 100 98 - 107 mmol/L    CO2 28.0 22.0 - 29.0 mmol/L    Calcium 8.6 8.6 - 10.5 mg/dL    BUN/Creatinine Ratio 13.6 7.0 - 25.0    Anion Gap 9.0 5.0 - 15.0 mmol/L    eGFR 105.3 >60.0 mL/min/1.73   CBC Auto Differential    Specimen: Blood   Result Value Ref Range    WBC 4.08 3.40 - 10.80 10*3/mm3    RBC 3.81 3.77 - 5.28 10*6/mm3    Hemoglobin 11.3 (L) 12.0 - 15.9 g/dL    Hematocrit 34.3 34.0 - 46.6 %    MCV 90.0 79.0 - 97.0 fL    MCH 29.7 26.6 - 33.0 pg    MCHC 32.9 31.5 - 35.7 g/dL    RDW 13.3 12.3 - 15.4 %    RDW-SD 43.8 37.0 - 54.0 fl    MPV 9.2 6.0 - 12.0 fL    Platelets 249 140 - 450 10*3/mm3    Neutrophil % 55.7 42.7 - 76.0 %    Lymphocyte % 15.2 (L) 19.6 - 45.3 %    Monocyte % 7.4 5.0 - 12.0 %    Eosinophil % 20.8 (H) 0.3 - 6.2 %    Basophil % 0.7 0.0 - 1.5 %    Immature Grans % 0.2 0.0 - 0.5 %    Neutrophils, Absolute 2.27 1.70 - 7.00 10*3/mm3    Lymphocytes, Absolute 0.62 (L) 0.70 - 3.10 10*3/mm3     Monocytes, Absolute 0.30 0.10 - 0.90 10*3/mm3    Eosinophils, Absolute 0.85 (H) 0.00 - 0.40 10*3/mm3    Basophils, Absolute 0.03 0.00 - 0.20 10*3/mm3    Immature Grans, Absolute 0.01 0.00 - 0.05 10*3/mm3    nRBC 0.0 0.0 - 0.2 /100 WBC   ECG 12 Lead   Result Value Ref Range    QT Interval 320 ms    QTC Interval 441 ms   Green Top (Gel)   Result Value Ref Range    Extra Tube Hold for add-ons.    Lavender Top   Result Value Ref Range    Extra Tube hold for add-on    Gold Top - SST   Result Value Ref Range    Extra Tube Hold for add-ons.    Gray Top   Result Value Ref Range    Extra Tube Hold for add-ons.    Light Blue Top   Result Value Ref Range    Extra Tube Hold for add-ons.    Duplex Venous Lower Extremity - Bilateral CAR   Result Value Ref Range    Target HR (85%) 139 bpm    Max. Pred. HR (100%) 163 bpm    Right Common Femoral Spont Y     Right Common Femoral Phasic Y     Right Common Femoral Augment Y     Right Common Femoral Compress C     Right Saphenofemoral Junction Compress C     Right Profunda Femoral Compress C     Right Proximal Femoral Compress C     Right Mid Femoral Spont Y     Right Mid Femoral Phasic Y     Right Mid Femoral Augment Y     Right Mid Femoral Compress C     Right Distal Femoral Compress C     Right Popliteal Spont Y     Right Popliteal Phasic Y     Right Popliteal Augment Y     Right Popliteal Compress C     Right Posterior Tibial Compress C     Right Peroneal Compress C     Right Gastronemius Compress C     Right Greater Saph AK Compress C     Right Greater Saph BK Compress C     Right Lesser Saph Compress C     Left Common Femoral Spont Y     Left Common Femoral Phasic Y     Left Common Femoral Augment Y     Left Common Femoral Compress C     Left Saphenofemoral Junction Compress C     Left Profunda Femoral Compress C     Left Proximal Femoral Compress C     Left Mid Femoral Spont Y     Left Mid Femoral Phasic Y     Left Mid Femoral Augment Y     Left Mid Femoral Compress C     Left  Distal Femoral Compress C     Left Popliteal Spont Y     Left Popliteal Phasic Y     Left Popliteal Augment Y     Left Popliteal Compress C     Left Posterior Tibial Compress C     Left Peroneal Compress C     Left Gastronemius Compress C     Left Greater Saph AK Compress C     Left Greater Saph BK Compress C     Left Lesser Saph Compress C         All other labs were within normal range or not returned as of this dictation.      EMERGENCY DEPARTMENT COURSE and DIFFERENTIAL DIAGNOSIS/MDM:   Vitals:    Vitals:    06/05/22 0400 06/05/22 0405 06/05/22 0409 06/05/22 0516   BP:    149/94   BP Location:    Right arm   Patient Position:    Lying   Pulse: 78 86 87 75   Resp:    18   Temp:    97.8 °F (36.6 °C)   TempSrc:    Oral   SpO2: 90% (!) 88% 96%    Weight:    73.8 kg (162 lb 12.8 oz)   Height:           ED Course as of 06/05/22 0545   Fri Jun 03, 2022   0334 Spoke w/ Dr. Chopra who accepts the pt for admission [NS]      ED Course User Index  [NS] Brandon Thompson MD       Patient presents with acute sepsis secondary to pneumonia with associated COPD exacerbation.  CTA chest demonstrates no evidence of PE, pneumothorax, pericardial effusion, or other acute intrathoracic process.  Labs demonstrate no evidence of significant anemia or electrolyte derangement.  Started on broad-spectrum IV antibiotics in the ED and given submental oxygen.          MEDICATIONS ADMINISTERED IN ED:  Medications   sodium chloride 0.9 % flush 10 mL (10 mL Intravenous Given 6/4/22 2128)   aspirin EC tablet 81 mg (81 mg Oral Given 6/4/22 0927)   brimonidine (ALPHAGAN) 0.2 % ophthalmic solution 1 drop (1 drop Both Eyes Given 6/4/22 2128)   cholecalciferol (VITAMIN D3) capsule 50,000 Units (has no administration in time range)   clopidogrel (PLAVIX) tablet 75 mg (75 mg Oral Given 6/4/22 0927)   DULoxetine (CYMBALTA) DR capsule 60 mg (60 mg Oral Given 6/4/22 0926)   folic acid (FOLVITE) tablet 1 mg (1 mg Oral Given 6/4/22 0927)   rosuvastatin  (CRESTOR) tablet 20 mg (20 mg Oral Given 6/4/22 0926)   piperacillin-tazobactam (ZOSYN) 3.375 g in iso-osmotic dextrose 50 ml (premix) (3.375 g Intravenous New Bag 6/5/22 0214)   sodium chloride 0.9 % infusion (75 mL/hr Intravenous New Bag 6/3/22 1019)   acetaminophen (TYLENOL) tablet 650 mg (650 mg Oral Given 6/5/22 0217)     Or   acetaminophen (TYLENOL) 160 MG/5ML solution 650 mg ( Oral Not Given:  See Alt 6/5/22 0217)     Or   acetaminophen (TYLENOL) suppository 650 mg ( Rectal Not Given:  See Alt 6/5/22 0217)   ipratropium-albuterol (DUO-NEB) nebulizer solution 3 mL (has no administration in time range)   nicotine (NICODERM CQ) 14 MG/24HR patch 1 patch (1 patch Transdermal Not Given 6/4/22 0927)   Enoxaparin Sodium (LOVENOX) syringe 40 mg (40 mg Subcutaneous Given 6/4/22 0926)   albuterol sulfate HFA (PROVENTIL HFA;VENTOLIN HFA;PROAIR HFA) inhaler 2 puff (2 puffs Inhalation Given 6/4/22 2015)     And   ipratropium (ATROVENT HFA) inhaler 2 puff (2 puffs Inhalation Given 6/4/22 2015)   potassium chloride (MICRO-K) CR capsule 40 mEq (40 mEq Oral Given 6/4/22 2234)   potassium chloride (KLOR-CON) packet 40 mEq (has no administration in time range)   Morphine sulfate (PF) injection 4 mg (4 mg Intravenous Given 6/3/22 0214)   ondansetron (ZOFRAN) injection 4 mg (4 mg Intravenous Given 6/3/22 0215)   iopamidol (ISOVUE-370) 76 % injection 100 mL (80 mL Intravenous Given 6/3/22 0232)   vancomycin 1250 mg/250 mL 0.9% NS IVPB ([facility]) (1,250 mg Intravenous New Bag 6/3/22 0458)   piperacillin-tazobactam (ZOSYN) 4.5 g in iso-osmotic dextrose 100 mL IVPB (premix) (0 g Intravenous Stopped 6/3/22 0458)         CRITICAL CARE TIME    Approximately 35 minutes of discontinuous critical care time was provided to this patient by myself absent of any time spent performing procedures.  Patient presents critically ill with acute sepsis secondary to pneumonia with associated hypoxia placing the cardiovascular, respiratory, neurologic, renal  systems at risk requiring the following interventions: Administration of broad-spectrum IV antibiotics, administration of supplemental oxygen, interpretation of lab/ECG/imaging, frequent reassessment, coordination admission with the following response: Resolution of hypoxia.  Patient at high risk of deterioration and possibly death without these interventions.        FINAL IMPRESSION      1. Acute sepsis (HCC)    2. Pneumonia due to infectious organism, unspecified laterality, unspecified part of lung    3. Hypoxia    4. Smoker          DISPOSITION/PLAN     ED Disposition     ED Disposition   Decision to Admit    Condition   --    Comment   Level of Care: Telemetry [5]   Diagnosis: Chest pain [450874]   Admitting Physician: RAJ WATTERS [[id number]]   Attending Physician: RAJ WATTERS [[id number]]   Isolate for COVID?: Yes [1]   Bed Request Comments: tele   Certification: I Certify That Inpatient Hospital Services Are Medically Necessary For Greater Than 2 Midnights               Brandon Thompson MD  Attending Emergency Physician               Brandon Thompson MD  06/05/22 7525

## 2022-06-03 NOTE — THERAPY EVALUATION
Acute Care - Speech Language Pathology   Swallow Initial Evaluation [facility]   Clinical Swallow Evaluation       Patient Name: Regine Alfred  : 1965  MRN: [id number]  Today's Date: 6/3/2022               Admit Date: 6/3/2022    Visit Dx:     ICD-10-CM ICD-9-CM   1. Acute sepsis (HCC)  A41.9 038.9     995.91   2. Pneumonia due to infectious organism, unspecified laterality, unspecified part of lung  J18.9 486   3. Hypoxia  R09.02 799.02   4. Smoker  F17.200 305.1     Patient Active Problem List   Diagnosis   • IHD (ischemic heart disease)   • Buerger's disease (HCC)   • Hyperlipidemia LDL goal <100   • Rheumatoid arthritis of multiple sites with negative rheumatoid factor (HCC)   • PVD (peripheral vascular disease) with claudication (HCC)   • Tobacco use   • Bilateral carotid bruits   • Pneumonia   • Acute sepsis (HCC)   • Chest pain     Past Medical History:   Diagnosis Date   • Anxiety    • Arthritis    • Colon polyp    • Easy bruising    • Glaucoma    • H/O blood clots    • Heart attack (CMS/HCC)    • History of chicken pox      Past Surgical History:   Procedure Laterality Date   • COLONOSCOPY     • CORONARY STENT PLACEMENT         SLP Recommendation and Plan  SLP Swallowing Diagnosis: swallow WFL (22)  SLP Diet Recommendation: regular textures, thin liquids (22)  Recommended Precautions and Strategies: upright posture during/after eating (22)  SLP Rec. for Method of Medication Administration: as tolerated (22)     Monitor for Signs of Aspiration: yes, notify SLP if any concerns (22)  Recommended Diagnostics: No further SLP services recommended (22)  Swallow Criteria for Skilled Therapeutic Interventions Met: no problems identified which require skilled intervention (22)        Therapy Frequency (Swallow): evaluation only (22)                                     Plan of Care Reviewed With: patient      SWALLOW  EVALUATION (last 72 hours)     SLP Adult Swallow Evaluation     Row Name 06/03/22 2698                   Rehab Evaluation    Document Type evaluation  -DV        Subjective Information complains of;fatigue  -DV        Patient Observations alert;cooperative  -DV        Patient/Family/Caregiver Comments/Observations none present  -DV        Patient Effort excellent  -DV        Symptoms Noted During/After Treatment none  -DV                  General Information    Patient Profile Reviewed yes  -DV        Pertinent History Of Current Problem 57yoF w/ PNA, COVID  -DV        Current Method of Nutrition soft textures;thin liquids  -DV        Precautions/Limitations, Vision WFL;for purposes of eval  -DV        Precautions/Limitations, Hearing WFL;for purposes of eval  -DV        Prior Level of Function-Communication WFL  -DV        Prior Level of Function-Swallowing no diet consistency restrictions  -DV        Plans/Goals Discussed with patient;agreed upon  -DV        Barriers to Rehab none identified  -DV        Patient's Goals for Discharge patient did not state  -DV                  Pain    Additional Documentation Pain Scale: Numbers Pre/Post-Treatment (Group)  -DV                  Pain Scale: Numbers Pre/Post-Treatment    Pretreatment Pain Rating 0/10 - no pain  -DV        Posttreatment Pain Rating 0/10 - no pain  -DV                  Oral Motor Structure and Function    Dentition Assessment natural, present and adequate  -DV        Secretion Management WNL/WFL  -DV        Mucosal Quality moist, healthy  -DV                  Oral Musculature and Cranial Nerve Assessment    Oral Motor General Assessment WFL  -DV                  General Eating/Swallowing Observations    Respiratory Support Currently in Use room air  -DV        Eating/Swallowing Skills self-fed  -DV        Positioning During Eating upright in bed  -DV        Utensils Used spoon;cup;straw  -DV        Consistencies Trialed thin liquids;pureed  -DV                   Respiratory    Respiratory Status WFL  -DV                  Clinical Swallow Eval    Oral Prep Phase WFL  -DV        Oral Transit WFL  -DV        Oral Residue WFL  -DV        Pharyngeal Phase no overt signs/symptoms of pharyngeal impairment  -DV        Clinical Swallow Evaluation Summary No s/sx aspiration or oral phase deficits. Regular solids not tested 2' pt decline but given normal oral motor skills, pt should be safe for regular/thin diet. Please notify SLP if any concern with diet.  -DV                  SLP Evaluation Clinical Impression    SLP Swallowing Diagnosis swallow WFL  -DV        Functional Impact no impact on function  -DV        Swallow Criteria for Skilled Therapeutic Interventions Met no problems identified which require skilled intervention  -DV                  Recommendations    Therapy Frequency (Swallow) evaluation only  -DV        SLP Diet Recommendation regular textures;thin liquids  -DV        Recommended Diagnostics No further SLP services recommended  -DV        Recommended Precautions and Strategies upright posture during/after eating  -DV        Oral Care Recommendations Oral Care BID/PRN  -DV        SLP Rec. for Method of Medication Administration as tolerated  -DV        Monitor for Signs of Aspiration yes;notify SLP if any concerns  -DV              User Key  (r) = Recorded By, (t) = Taken By, (c) = Cosigned By    Initials Name Effective Dates    DV Angelica Velez, MS MORRISON-SLP 06/16/21 -                 EDUCATION  The patient has been educated in the following areas:   Dysphagia (Swallowing Impairment).              Time Calculation:    Time Calculation- SLP     Row Name 06/03/22 1540             Time Calculation- SLP    SLP Start Time 1515  -DV      SLP Received On 06/03/22  -DV              Untimed Charges    SLP Eval/Re-eval  ST Eval Oral Pharyng Swallow - 86317  -DV      [id number]-OP Eval Oral Pharyng Swallow Minutes 30  -DV              Total Minutes    Untimed Charges Total  Minutes 30  -DV       Total Minutes 30  -DV            User Key  (r) = Recorded By, (t) = Taken By, (c) = Cosigned By    Initials Name Provider Type    Angelica Silva MS CCC-SLP Speech and Language Pathologist                Therapy Charges for Today     Code Description Service Date Service Provider Modifiers Qty    [id number]  ST EVAL ORAL PHARYNG SWALLOW 2 6/3/2022 Angelica Velez MS CCC-NICK GN 1               MS AJIT Hicks  6/3/2022

## 2022-06-03 NOTE — PLAN OF CARE
Pt VSS this shift. Pt was drowsy and slept most of the shift. Pt had complaints of lower back pain treated with PRN  Problem: Adult Inpatient Plan of Care  Goal: Plan of Care Review  Outcome: Ongoing, Progressing  Flowsheets (Taken 6/3/2022 1540 by Angelica Velez, MS CCC-SLP)  Plan of Care Reviewed With: patient  Goal: Patient-Specific Goal (Individualized)  Outcome: Ongoing, Progressing  Goal: Absence of Hospital-Acquired Illness or Injury  Outcome: Ongoing, Progressing  Intervention: Identify and Manage Fall Risk  Description: Perform standard risk assessment on admission using a validated tool or comprehensive approach appropriate to the patient; reassess fall risk frequently, with change in status or transfer to another level of care.  Communicate fall injury risk to interprofessional healthcare team.  Determine need for increased observation, equipment and environmental modification, such as low bed, signage and supportive, nonskid footwear.  Adjust safety measures to individual developmental age, stage and identified risk factors.  Reinforce the importance of safety and physical activity with patient and family.  Perform regular intentional rounding to assess need for position change, pain assessment and personal needs, including assistance with toileting.  Flowsheets  Taken 6/3/2022 1800 by Adele Rodriguez, PCT  Safety Promotion/Fall Prevention:   activity supervised   assistive device/personal items within reach   clutter free environment maintained   fall prevention program maintained   nonskid shoes/slippers when out of bed   safety round/check completed  Taken 6/3/2022 1633 by Nabila Golden, RN  Safety Promotion/Fall Prevention:   activity supervised   assistive device/personal items within reach   clutter free environment maintained   nonskid shoes/slippers when out of bed   room organization consistent   safety round/check completed   toileting scheduled  Taken 6/3/2022 1415 by Nabila Golden, RN  Safety  Promotion/Fall Prevention:   assistive device/personal items within reach   room organization consistent   safety round/check completed   toileting scheduled   fall prevention program maintained  Taken 6/3/2022 1219 by Nabila Golden RN  Safety Promotion/Fall Prevention:   activity supervised   assistive device/personal items within reach   nonskid shoes/slippers when out of bed   room organization consistent   toileting scheduled   safety round/check completed  Taken 6/3/2022 1030 by Nabila Golden RN  Safety Promotion/Fall Prevention:   activity supervised   clutter free environment maintained   fall prevention program maintained   nonskid shoes/slippers when out of bed   room organization consistent   toileting scheduled   safety round/check completed  Intervention: Prevent Skin Injury  Description: Perform a screening for skin injury risk, such as pressure or moisture associated skin damage on admission and at regular intervals throughout hospital stay.  Keep all areas of skin (especially folds) clean and dry.  Maintain adequate skin hydration.  Relieve and redistribute pressure and protect bony prominences; implement measures based on patient-specific risk factors.  Match turning and repositioning schedule to clinical condition.  Encourage weight shift frequently; assist with reposition if unable to complete independently.  Float heels off bed; avoid pressure on the Achilles tendon.  Keep skin free from extended contact with medical devices.  Encourage functional activity and mobility, as early as tolerated.  Use aids (e.g., slide boards, mechanical lift) during transfer.  Flowsheets  Taken 6/3/2022 1800 by Adele Rodriguez PCT  Body Position:   position changed independently   sitting up in bed  Taken 6/3/2022 1633 by Nabila Golden, RN  Body Position: position changed independently  Taken 6/3/2022 1415 by Nabila Golden RN  Body Position: position changed independently  Taken 6/3/2022 1219 by Nabila Golden  RN  Body Position: position changed independently  Taken 6/3/2022 1030 by Nabila Golden RN  Body Position: position changed independently  Taken 6/3/2022 0800 by Nabila Golden RN  Body Position: position changed independently  Skin Protection: adhesive use limited  Intervention: Prevent and Manage VTE (Venous Thromboembolism) Risk  Description: Assess for VTE (venous thromboembolism) risk.  Encourage and assist with early ambulation.  Initiate and maintain compression or other therapy, as indicated, based on identified risk in accordance with organizational protocol and provider order.  Encourage both active and passive leg exercises while in bed, if unable to ambulate.  Flowsheets  Taken 6/3/2022 1800 by Adele Rodriguez, PCT  Activity Management: activity adjusted per tolerance  Taken 6/3/2022 0800 by Nabila Golden RN  VTE Prevention/Management:   bilateral   dorsiflexion/plantar flexion performed  Range of Motion: active ROM (range of motion) encouraged  Intervention: Prevent Infection  Description: Maintain skin and mucous membrane integrity; promote hand, oral and pulmonary hygiene.  Optimize fluid balance, nutrition, sleep and glycemic control to maximize infection resistance.  Identify potential sources of infection early to prevent or mitigate progression of infection (e.g., wound, lines, devices).  Evaluate ongoing need for invasive devices; remove promptly when no longer indicated.  Flowsheets  Taken 6/3/2022 1415  Infection Prevention: environmental surveillance performed  Taken 6/3/2022 0800  Infection Prevention: environmental surveillance performed  Goal: Optimal Comfort and Wellbeing  Outcome: Ongoing, Progressing  Intervention: Monitor Pain and Promote Comfort  Description: Assess pain level, treatment efficacy and patient response at regular intervals using a consistent pain scale.  Consider the presence and impact of preexisting chronic pain.  Encourage patient and caregiver involvement in pain  assessment, interventions and safety measures.  Flowsheets (Taken 6/3/2022 0600 by Van Matthews RN)  Pain Management Interventions: quiet environment facilitated  Intervention: Provide Person-Centered Care  Description: Use a family-focused approach to care.  Develop trust and rapport by proactively providing information, encouraging questions, addressing concerns and offering reassurance.  Acknowledge emotional response to hospitalization.  Recognize and utilize personal coping strategies.  Honor spiritual and cultural preferences.  Flowsheets (Taken 6/3/2022 0800)  Trust Relationship/Rapport: care explained  Goal: Readiness for Transition of Care  Outcome: Ongoing, Progressing  Intervention: Mutually Develop Transition Plan  Description: Identify available resources for support (e.g., family, friends, community).  Identify and address barriers to ongoing treatment and home management (e.g., environmental, financial).  Provide opportunities to practice self-management skills.  Assess and monitor emotional readiness for transition.  Establish or reconnect linkage with outpatient providers or community-based services.  Flowsheets  Taken 6/3/2022 0538 by Van Matthews, RN  Transportation Anticipated: car, drives self  Patient/Family Anticipated Services at Transition: none  Patient/Family Anticipates Transition to: home with family  Taken 6/3/2022 0537 by Van Matthews, RN  Equipment Currently Used at Home: none     Problem: Asthma Comorbidity  Goal: Maintenance of Asthma Control  Outcome: Ongoing, Progressing  Intervention: Maintain Asthma Symptom Control  Description: Evaluate adherence to self-management (asthma action plan), such as medication, symptom-control, trigger-avoidance and self-monitoring.  Advocate for continuation of home regimen, including medication, method of delivery, schedule and symptom monitoring; acknowledge preferred modality and routine.  Minimize exposure to potential triggers, such as perfume,  cleaning chemicals and all types of smoke.  Assess for proper use of inhaled medication and delivery technique; assist or reinstruct if needed.  Evaluate effectiveness of coping skills; encourage expression of feelings, expectations and concerns related to disease management and quality of life; reinforce education to enhance management plan and wellbeing.  Flowsheets  Taken 6/3/2022 1633  Medication Review/Management: medications reviewed  Taken 6/3/2022 1415  Medication Review/Management: medications reviewed  Taken 6/3/2022 1219  Medication Review/Management: medications reviewed  Taken 6/3/2022 1030  Medication Review/Management: medications reviewed     Problem: Behavioral Health Comorbidity  Goal: Maintenance of Behavioral Health Symptom Control  Outcome: Ongoing, Progressing  Intervention: Maintain Behavioral Health Symptom Control  Description: Confirm mental health diagnosis and current treatment.  Evaluate adherence to previously identified self-management plan.  Advocate continuation of home strategies, including medication.  Evaluate effectiveness of self-management strategies and coping skills.  Communicate with providers to ensure continuity and follow-up at transition.  Flowsheets  Taken 6/3/2022 1633  Medication Review/Management: medications reviewed  Taken 6/3/2022 1415  Medication Review/Management: medications reviewed  Taken 6/3/2022 1219  Medication Review/Management: medications reviewed  Taken 6/3/2022 1030  Medication Review/Management: medications reviewed     Problem: COPD (Chronic Obstructive Pulmonary Disease) Comorbidity  Goal: Maintenance of COPD Symptom Control  Outcome: Ongoing, Progressing  Intervention: Maintain COPD-Symptom Control  Description: Evaluate adherence to management plan (e.g., medication, trigger avoidance, infection prevention, self-monitoring).  Advocate for continuation of home regimen, including medication, method of delivery, schedule and symptom  monitoring.  Anticipate the need for breathing techniques and activity pacing to minimize fatigue and breathlessness.  Assess for proper use of inhaled medication and delivery technique; assist or reinstruct if needed.  Evaluate effectiveness of coping skills; encourage expression of feelings, expectations and concerns related to disease management and quality of life; reinforce education to enhance management plan and wellbeing.  Flowsheets  Taken 6/3/2022 1633  Medication Review/Management: medications reviewed  Taken 6/3/2022 1415  Medication Review/Management: medications reviewed  Taken 6/3/2022 1219  Medication Review/Management: medications reviewed  Taken 6/3/2022 1030  Medication Review/Management: medications reviewed  Taken 6/3/2022 0800  Supportive Measures:   verbalization of feelings encouraged   positive reinforcement provided     Problem: Diabetes Comorbidity  Goal: Blood Glucose Level Within Targeted Range  Outcome: Ongoing, Progressing     Problem: Heart Failure Comorbidity  Goal: Maintenance of Heart Failure Symptom Control  Outcome: Ongoing, Progressing  Intervention: Maintain Heart Failure-Management  Description: Evaluate adherence to home heart failure self-care regimen (e.g., medication, fluid balance, sodium intake, daily weight, physical activity, telemonitoring, support).  Advocate continuation of home medication and schedule.  Consider pharmacologic therapy administration time and effects (e.g., avoid giving diuretic prior to bedtime or nitrates on empty stomach).  Monitor response to pharmacologic therapy, including weight fluctuations, blood pressure and electrolyte levels.  Monitor for signs and symptoms of anxiety and depression, including severity and duration; if present, provide psychosocial support.  Consider need for heart failure clinic or palliative care consult.  Flowsheets  Taken 6/3/2022 1633  Medication Review/Management: medications reviewed  Taken 6/3/2022  1415  Medication Review/Management: medications reviewed  Taken 6/3/2022 1219  Medication Review/Management: medications reviewed  Taken 6/3/2022 1030  Medication Review/Management: medications reviewed     Problem: Hypertension Comorbidity  Goal: Blood Pressure in Desired Range  Outcome: Ongoing, Progressing  Intervention: Maintain Blood Pressure Management  Description: Evaluate adherence to home antihypertensive regimen (e.g., exercise and activity, diet modification, medication).  Provide scheduled antihypertensive medication; consider administration time and effects (e.g., avoid giving diuretic prior to bedtime).  Monitor response to antihypertensive medication therapy (e.g., blood pressure, electrolyte levels, medication effects).  Minimize risk of orthostatic hypotension; encourage caution with position changes, particularly if elderly.  Flowsheets  Taken 6/3/2022 1633  Medication Review/Management: medications reviewed  Taken 6/3/2022 1415  Medication Review/Management: medications reviewed  Taken 6/3/2022 1219  Medication Review/Management: medications reviewed  Taken 6/3/2022 1030  Medication Review/Management: medications reviewed     Problem: Obstructive Sleep Apnea Risk or Actual Comorbidity Management  Goal: Unobstructed Breathing During Sleep  Outcome: Ongoing, Progressing     Problem: Osteoarthritis Comorbidity  Goal: Maintenance of Osteoarthritis Symptom Control  Outcome: Ongoing, Progressing  Intervention: Maintain Osteoarthritis Symptom Control  Description: Evaluate adherence to self-management plan, such as medication, exercise and weight management.  Advocate for continuation of home regimen, such as medication, physical activity and thermal agents; monitor response.  Encourage participation in functional activities, such as mobility and ADLs (activities of daily living) to minimize decline associated with inactivity.  Facilitate use of patient-specific assistive devices, equipment or  orthoses.  Evaluate effectiveness of coping skills; encourage expression of feelings, expectations and concerns related to disease management and quality of life; reinforce education to enhance management plan and wellbeing.  Flowsheets  Taken 6/3/2022 1800 by Adele Rodriguez PCT  Activity Management: activity adjusted per tolerance  Taken 6/3/2022 1633 by Nabila Golden RN  Medication Review/Management: medications reviewed  Taken 6/3/2022 1415 by Nabila Golden RN  Medication Review/Management: medications reviewed  Taken 6/3/2022 1219 by Nabila Golden RN  Medication Review/Management: medications reviewed  Taken 6/3/2022 1030 by Nabila Golden RN  Medication Review/Management: medications reviewed     Problem: Pain Chronic (Persistent) (Comorbidity Management)  Goal: Acceptable Pain Control and Functional Ability  Outcome: Ongoing, Progressing  Intervention: Manage Persistent Pain  Description: Evaluate pain level, effect of treatment and patient response at regular intervals.  Minimize pain stimuli; coordinate care and adjust environment (e.g., light, noise, unnecessary movement); promote sleep/rest.  Match pharmacologic analgesia to severity and type of pain mechanism (e.g., neuropathic, muscle, inflammatory); consider multimodal approach (e.g., nonopioid, opioid, adjuvant).  Provide medication at regular intervals; titrate to patient response.  Manage breakthrough pain with additional doses; consider rotation or switching medication.  Monitor for signs of substance tolerance (increased dose to reach desired effect, decreased effect with same dose).  Avoid abrupt withdrawal of medication, especially agents capable of causing physical dependence.  Manage medication-induced effects, such as constipation, nausea, pruritus, urinary retention, somnolence and dizziness.  Provide multimodal treatment interventions, such as physical activity, therapeutic exercise, yoga, TENS (transcutaneous electrical nerve  stimulation) and manual therapy.  Train in functional activity modifications, such as body mechanics, posture, ergonomics, energy conservation and activity pacing.  Consider addition of complementary or alternative therapy, such as acupuncture, hypnosis or therapeutic touch.  Flowsheets  Taken 6/3/2022 1633  Medication Review/Management: medications reviewed  Taken 6/3/2022 1415  Medication Review/Management: medications reviewed  Taken 6/3/2022 1219  Medication Review/Management: medications reviewed  Taken 6/3/2022 1030  Medication Review/Management: medications reviewed  Taken 6/3/2022 0800  Sleep/Rest Enhancement:   awakenings minimized   relaxation techniques promoted  Intervention: Develop Pain Management Plan  Description: Acknowledge patient as the expert in pain self-management.  Use a consistent, validated tool for pain assessment; include function and quality of life.  Evaluate risk for opioid use and dependence.  Set pain management goals; determine acceptable level of discomfort to allow for maximal functioning and quality of life.  Determine [id number] pain management plan, including both pharmacologic and nonpharmacologic measures.  Identify and integrate past successful treatment measures, if able.  Encourage patient and caregiver involvement in pain assessment, interventions and safety measures.  Re-evaluate plan regularly.  Flowsheets (Taken 6/3/2022 0600 by Van Matthews RN)  Pain Management Interventions: quiet environment facilitated  Intervention: Optimize Psychosocial Wellbeing  Description: Facilitate patient’s self-control over pain by providing pain information and allowing choices in treatment.  Consider and address emotional response to pain.  Explore and promote use of coping strategies; address barriers to successful coping.  Evaluate and assist with psychosocial, cultural and spiritual factors impacting pain.  Modify pain perception by using techniques, such as distraction,  mindfulness, guided imagery, meditation or music.  Assess and monitor for signs and symptoms of behavioral health concerns, such as unhealthy substance use, depression and suicidal ideation.  Consider referral for ongoing coping support, such as cognitive behavioral therapy and mindfulness-based stress reduction.  Flowsheets  Taken 6/3/2022 0800 by Nabila Golden RN  Supportive Measures:   verbalization of feelings encouraged   positive reinforcement provided  Taken 6/3/2022 0600 by Van Matthews RN  Diversional Activities: television  Family/Support System Care: caregiver stress acknowledged     Problem: Seizure Disorder Comorbidity  Goal: Maintenance of Seizure Control  Outcome: Ongoing, Progressing   Goal Outcome Evaluation:

## 2022-06-03 NOTE — PROGRESS NOTES
Pharmacy consult to dose Enoxaparin for Regine Meanslore Alfred    Estimated Creatinine Clearance: 99.4 mL/min (by C-G formula based on SCr of 0.67 mg/dL).  68 kg (150 lb)    Plan: Enoxaparin 1 mg/kg (70 mg) subcut Q12H  Diagnosis: Active thrombus       Rishi Sampson, [facility]  6/3/2022  04:47 EDT

## 2022-06-03 NOTE — PAYOR COMM NOTE
"Ref # [id number]  Nabila Newell RN, BSN, CMGT-BC  Phone # [phone]  Fax # [phone]  Regine Alfred (57 y.o. Female)             Date of Birth   1965    Social Security Number       Address   [address] [place] [address]    Home Phone   [phone]    MRN   [id number]       Restorationist   Patient Refused    Marital Status                               Admission Date   6/3/22    Admission Type   Emergency    Admitting Provider   Erinn Rodriguez MD    Attending Provider   Erinn Rodriguez MD    Department, Room/Bed   [address], S522/1       Discharge Date       Discharge Disposition       Discharge Destination                               Attending Provider: Erinn Rodriguez MD    Allergies: Novocain [Procaine]    Isolation: Contact Air   Infection: COVID (confirmed) (06/03/22)   Code Status: CPR   Advance Care Planning Activity    Ht: 177.8 cm (70\")   Wt: 68 kg (150 lb)    Admission Cmt: None   Principal Problem: Pneumonia [J18.9]                 History & Physical    No notes of this type exist for this encounter.         Emergency Department Notes    No notes of this type exist for this encounter.           Current Facility-Administered Medications   Medication Dose Route Frequency Provider Last Rate Last Admin   • [START ON 6/4/2022] ! Vancomycin level before 18:00 dose on Saturday 6/4/22; hold dose until level checked by a pharmacist   Does not apply Once Rishi Sampson, [facility]       • acetaminophen (TYLENOL) tablet 650 mg  650 mg Oral Q4H PRN Candi, Aleksandra, APRN        Or   • acetaminophen (TYLENOL) 160 MG/5ML solution 650 mg  650 mg Oral Q4H PRN Candi, Aleksandra, APRN        Or   • acetaminophen (TYLENOL) suppository 650 mg  650 mg Rectal Q4H PRN Candi, Aleksandra, APRN       • aspirin EC tablet 81 mg  81 mg Oral Daily Candi, Aleksandra, APRN   81 mg at 06/03/22 1019   • brimonidine (ALPHAGAN) 0.2 % ophthalmic solution 1 drop  1 drop Both Eyes BID " Candi, Aleksandra, APRN   1 drop at 06/03/22 1020   • [START ON 6/6/2022] cholecalciferol (VITAMIN D3) capsule 50,000 Units  50,000 Units Oral Q7 Days Candi, Aleksandra, APRN       • clopidogrel (PLAVIX) tablet 75 mg  75 mg Oral Daily Candi, Aleksandra, APRN   75 mg at 06/03/22 1019   • DULoxetine (CYMBALTA) DR capsule 60 mg  60 mg Oral Daily Candi, Aleksandra, APRN   60 mg at 06/03/22 1019   • Enoxaparin Sodium (LOVENOX) syringe 70 mg  1 mg/kg Subcutaneous Q12H Adrián, Rishi VAZQUEZ, [facility]   70 mg at 06/03/22 0458   • folic acid (FOLVITE) tablet 1 mg  1 mg Oral Daily Candi, Aleksandra, APRN   1 mg at 06/03/22 1019   • ipratropium-albuterol (DUO-NEB) nebulizer solution 3 mL  3 mL Nebulization 4x Daily - RT Candi, Aleksandra, APRN   3 mL at 06/03/22 1140   • ipratropium-albuterol (DUO-NEB) nebulizer solution 3 mL  3 mL Nebulization Q4H PRN Candi, Aleksandra, APRN       • nicotine (NICODERM CQ) 14 MG/24HR patch 1 patch  1 patch Transdermal Q24H Candi, Aleksandra, APRN       • Pharmacy to Dose enoxaparin (LOVENOX)   Does not apply Continuous PRN Candi, Aleksandra, APRN       • Pharmacy to dose vancomycin   Does not apply Continuous PRN Candi, Aleksandra, APRN       • piperacillin-tazobactam (ZOSYN) 3.375 g in iso-osmotic dextrose 50 ml (premix)  3.375 g Intravenous Q8H Candi, Aleksandra, APRN   3.375 g at 06/03/22 1211   • rosuvastatin (CRESTOR) tablet 20 mg  20 mg Oral Daily Candi, Aleksandra, APRN   20 mg at 06/03/22 1019   • sodium chloride 0.9 % flush 10 mL  10 mL Intravenous PRN Brandon Thompson MD   10 mL at 06/03/22 1020   • sodium chloride 0.9 % infusion  75 mL/hr Intravenous Continuous Candi, Aleksandra, APRN 75 mL/hr at 06/03/22 1019 75 mL/hr at 06/03/22 1019   • vancomycin (VANCOCIN) 1000 mg/200 mL dextrose 5% IVPB  1,000 mg Intravenous Q12H Rishi Sampson, [facility]         Lab Results (last 48 hours)     Procedure Component Value Units Date/Time    MRSA Screen, PCR (Inpatient) - Swab, Nares [[id number]]  (Normal) Collected: 06/03/22  0956    Specimen: Swab from Nares Updated: 06/03/22 1122     MRSA PCR Negative    Narrative:      MRSA Negative    Basic Metabolic Panel [[id number]]  (Abnormal) Collected: 06/03/22 0712    Specimen: Blood Updated: 06/03/22 0740     Glucose 92 mg/dL      BUN 10 mg/dL      Creatinine 0.63 mg/dL      Sodium 135 mmol/L      Potassium 3.5 mmol/L      Chloride 99 mmol/L      CO2 27.0 mmol/L      Calcium 8.7 mg/dL      BUN/Creatinine Ratio 15.9     Anion Gap 9.0 mmol/L      eGFR 103.6 mL/min/1.73      Comment: National Kidney Foundation and American Society of Nephrology (ASN) Task Force recommended calculation based on the Chronic Kidney Disease Epidemiology Collaboration (CKD-EPI) equation refit without adjustment for race.       Narrative:      GFR Normal >60  Chronic Kidney Disease <60  Kidney Failure <15      CBC Auto Differential [[id number]]  (Abnormal) Collected: 06/03/22 0712    Specimen: Blood Updated: 06/03/22 0722     WBC 6.42 10*3/mm3      RBC 3.92 10*6/mm3      Hemoglobin 11.7 g/dL      Hematocrit 35.7 %      MCV 91.1 fL      MCH 29.8 pg      MCHC 32.8 g/dL      RDW 13.2 %      RDW-SD 44.1 fl      MPV 8.8 fL      Platelets 269 10*3/mm3      Neutrophil % 68.7 %      Lymphocyte % 12.9 %      Monocyte % 5.3 %      Eosinophil % 12.0 %      Basophil % 0.8 %      Immature Grans % 0.3 %      Neutrophils, Absolute 4.41 10*3/mm3      Lymphocytes, Absolute 0.83 10*3/mm3      Monocytes, Absolute 0.34 10*3/mm3      Eosinophils, Absolute 0.77 10*3/mm3      Basophils, Absolute 0.05 10*3/mm3      Immature Grans, Absolute 0.02 10*3/mm3      nRBC 0.0 /100 WBC     COVID PRE-OP / PRE-PROCEDURE SCREENING ORDER (NO ISOLATION) - Swab, Nasopharynx [[id number]]  (Normal) Collected: 06/03/22 0510    Specimen: Swab from Nasopharynx Updated: 06/03/22 0551    Narrative:      The following orders were created for panel order COVID PRE-OP / PRE-PROCEDURE SCREENING ORDER (NO ISOLATION) - Swab, Nasopharynx.  Procedure                           "     Abnormality         Status                     ---------                               -----------         ------                     COVID-19 and FLU A/B PCR...[[id number]]  Normal              Final result                 Please view results for these tests on the individual orders.    COVID-19 and FLU A/B PCR - Swab, Nasopharynx [[id number]]  (Normal) Collected: 06/03/22 0510    Specimen: Swab from Nasopharynx Updated: 06/03/22 0551     COVID19 Not Detected     Influenza A PCR Not Detected     Influenza B PCR Not Detected    Narrative:      Fact sheet for providers: https://www.fda.gov/media/719535/download    Fact sheet for patients: https://www.fda.gov/media/746900/download    Test performed by PCR.    Procalcitonin [[id number]]  (Normal) Collected: 06/03/22 0401    Specimen: Blood Updated: 06/03/22 0439     Procalcitonin 0.08 ng/mL     Narrative:      As a Marker for Sepsis (Non-Neonates):    1. <0.5 ng/mL represents a low risk of severe sepsis and/or septic shock.  2. >2 ng/mL represents a high risk of severe sepsis and/or septic shock.    As a Marker for Lower Respiratory Tract Infections that require antibiotic therapy:    PCT on Admission    Antibiotic Therapy       6-12 Hrs later    >0.5                Strongly Recommended  >0.25 - <0.5        Recommended   0.1 - 0.25          Discouraged              Remeasure/reassess PCT  <0.1                Strongly Discouraged     Remeasure/reassess PCT    As 28 day mortality risk marker: \"Change in Procalcitonin Result\" (>80% or <=80%) if Day 0 (or Day 1) and Day 4 values are available. Refer to http://www.[employer]-pct-calculator.com    Change in PCT <=80%  A decrease of PCT levels below or equal to 80% defines a positive change in PCT test result representing a higher risk for 28-day all-cause mortality of patients diagnosed with severe sepsis for septic shock.    Change in PCT >80%  A decrease of PCT levels of more than 80% defines a negative change in PCT result " representing a lower risk for 28-day all-cause mortality of patients diagnosed with severe sepsis or septic shock.       [place] Draw [[id number]] Collected: 06/03/22 0019    Specimen: Blood Updated: 06/03/22 0432    Narrative:      The following orders were created for panel order [place] Draw.  Procedure                               Abnormality         Status                     ---------                               -----------         ------                     Green Top (Gel)[[id number]]                                  Final result               Lavender Top[[id number]]                                     Final result               Gold Top - SST[[id number]]                                   Final result               Krishna Top[[id number]]                                         Final result               Light Blue Top[[id number]]                                   Final result                 Please view results for these tests on the individual orders.    Krishna Top [[id number]] Collected: 06/03/22 0019    Specimen: Blood Updated: 06/03/22 0432     Extra Tube Hold for add-ons.     Comment: Auto resulted.       Troponin [[id number]]  (Normal) Collected: 06/03/22 0401    Specimen: Blood Updated: 06/03/22 0429     Troponin T <0.010 ng/mL     Narrative:      Troponin T Reference Range:  <= 0.03 ng/mL-   Negative for AMI  >0.03 ng/mL-     Abnormal for myocardial necrosis.  Clinicians would have to utilize clinical acumen, EKG, Troponin and serial changes to determine if it is an Acute Myocardial Infarction or myocardial injury due to an underlying chronic condition.       Results may be falsely decreased if patient taking Biotin.      Lactic Acid, Plasma [[id number]]  (Normal) Collected: 06/03/22 0401    Specimen: Blood Updated: 06/03/22 0427     Lactate 0.5 mmol/L      Comment: Falsely depressed results may occur on samples drawn from patients receiving N-Acetylcysteine (NAC) or Metamizole.       Blood Culture - Blood,  Hand, Right [[id number]] Collected: 06/03/22 0359    Specimen: Blood from Hand, Right Updated: 06/03/22 0413    Blood Culture - Blood, Arm, Right [[id number]] Collected: 06/03/22 0355    Specimen: Blood from Arm, Right Updated: 06/03/22 0411    D-dimer, Quantitative [[id number]]  (Abnormal) Collected: 06/03/22 0203    Specimen: Blood Updated: 06/03/22 0329     D-Dimer, Quantitative 1.47 MCGFEU/mL     Narrative:      The D-Dimer assay test is to be used in conjunction with a clinical pretest probability (PTP) assessment model, and has been approved by the FDA to exclude pulmonary embolism (PE) and deep venous thrombosis (DVT) in outpatients suspected of PE or DVT, with a cutoff of 0.5 MCGFEU/mL.    Light Blue Top [[id number]] Collected: 06/03/22 0203    Specimen: Blood Updated: 06/03/22 0317     Extra Tube Hold for add-ons.     Comment: Auto resulted       Comprehensive Metabolic Panel [[id number]]  (Abnormal) Collected: 06/03/22 0019    Specimen: Blood Updated: 06/03/22 0157     Glucose 97 mg/dL      BUN 11 mg/dL      Creatinine 0.67 mg/dL      Sodium 135 mmol/L      Potassium 4.3 mmol/L      Comment: Specimen hemolyzed.  Results may be affected.        Chloride 100 mmol/L      CO2 23.0 mmol/L      Calcium 9.1 mg/dL      Total Protein 6.9 g/dL      Albumin 2.80 g/dL      ALT (SGPT) 10 U/L      Comment: Specimen hemolyzed.  Results may be affected.        AST (SGOT) 23 U/L      Alkaline Phosphatase 78 U/L      Total Bilirubin 0.2 mg/dL      Globulin 4.1 gm/dL      Comment: Calculated Result        A/G Ratio 0.7 g/dL      BUN/Creatinine Ratio 16.4     Anion Gap 12.0 mmol/L      eGFR 102.1 mL/min/1.73      Comment: National Kidney Foundation and American Society of Nephrology (ASN) Task Force recommended calculation based on the Chronic Kidney Disease Epidemiology Collaboration (CKD-EPI) equation refit without adjustment for race.       Narrative:      GFR Normal >60  Chronic Kidney Disease <60  Kidney Failure  <15  Hemolyzed specimen. Testing performed per physician request.      BNP [[id number]]  (Normal) Collected: 06/03/22 0019    Specimen: Blood Updated: 06/03/22 0144     proBNP 794.1 pg/mL     Narrative:      Among patients with dyspnea, NT-proBNP is highly sensitive for the detection of acute congestive heart failure. In addition NT-proBNP of <300 pg/ml effectively rules out acute congestive heart failure with 99% negative predictive value.    Results may be falsely decreased if patient taking Biotin.      CBC & Differential [[id number]]  (Abnormal) Collected: 06/03/22 0019    Specimen: Blood Updated: 06/03/22 0143    Narrative:      The following orders were created for panel order CBC & Differential.  Procedure                               Abnormality         Status                     ---------                               -----------         ------                     CBC Auto Differential[[id number]]        Abnormal            Final result               Scan Slide[[id number]]                   Normal              Final result                 Please view results for these tests on the individual orders.    CBC Auto Differential [[id number]]  (Abnormal) Collected: 06/03/22 0019    Specimen: Blood Updated: 06/03/22 0143     WBC 7.21 10*3/mm3      RBC 3.90 10*6/mm3      Hemoglobin 11.4 g/dL      Hematocrit 36.6 %      MCV 93.8 fL      MCH 29.2 pg      MCHC 31.1 g/dL      RDW 13.5 %      RDW-SD 46.4 fl      MPV 9.9 fL      Platelets 339 10*3/mm3      Neutrophil % 75.7 %      Lymphocyte % 8.9 %      Monocyte % 5.1 %      Eosinophil % 9.3 %      Basophil % 0.7 %      Immature Grans % 0.3 %      Neutrophils, Absolute 5.46 10*3/mm3      Lymphocytes, Absolute 0.64 10*3/mm3      Monocytes, Absolute 0.37 10*3/mm3      Eosinophils, Absolute 0.67 10*3/mm3      Basophils, Absolute 0.05 10*3/mm3      Immature Grans, Absolute 0.02 10*3/mm3      nRBC 0.0 /100 WBC     Narrative:      Appended report. These results have been appended  to a previously verified report.    Scan Slide [[id number]]  (Normal) Collected: 06/03/22 0019    Specimen: Blood Updated: 06/03/22 0143     RBC Morphology Normal     WBC Morphology Normal     Platelet Morphology Normal    Lavender Top [[id number]] Collected: 06/03/22 0019    Specimen: Blood Updated: 06/03/22 0132     Extra Tube hold for add-on     Comment: Auto resulted       Green Top (Gel) [[id number]] Collected: 06/03/22 0019    Specimen: Blood Updated: 06/03/22 0132     Extra Tube Hold for add-ons.     Comment: Auto resulted.       Gold Top - SST [[id number]] Collected: 06/03/22 0019    Specimen: Blood Updated: 06/03/22 0132     Extra Tube Hold for add-ons.     Comment: Auto resulted.             Imaging Results (Last 48 Hours)     Procedure Component Value Units Date/Time    CT Angiogram Chest [[id number]] Collected: 06/03/22 0250     Updated: 06/03/22 0259    Narrative:      EXAMINATION: CT ANGIOGRAM CHEST WITH IV CONTRAST       INDICATION: Covid, shortness of breath, tachycardia, hypoxia    COMPARISON: Radiograph from today    PROCEDURE: Multiple axial CT images were obtained of the chest after IV administration of contrast.  Coronal and sagittal reformations as well as MIP images were obtained. CT dose lowering techniques were used, to include: automated exposure control,   adjustment for patient size, and or use of iterative reconstruction.    FINDINGS:    Cardiovascular: No filling defects are seen in the pulmonary arteries. No focal aortic aneurysm or evidence of aortic dissection is seen. There are relatively extensive calcified and noncalcified atherosclerotic plaques in the aorta and branching vessels   including in the coronary arteries.    Mediastinum/Dian: Small sliding-type hiatal hernia. There are mildly prominent mediastinal/hilar lymph nodes, some of which are partially calcified.    Lungs/Pleura :  No pneumothorax or pleural fluid collection. There is heterogeneous opacity in the posterior medial  right lung base.    Chest wall and Axilla: Unremarkable.    Bones:  No acute focal bony abnormality seen.    Upper abdomen: No focal abnormalities seen.      Impression:        No CTA evidence of pulmonary embolism or acute aortic pathology.    Heterogeneous opacity in the posterior medial right lung base, concerning for pneumonia. An evolving infarct could potentially have this appearance as well, though no definite pulmonary embolism seen at this site. A follow-up chest CT is recommended in 2   months to ensure complete clearance of this and to exclude an underlying lesion.    Relatively extensive atherosclerosis including coronary calcifications.    Small sliding-type hiatal hernia.    RECOMMENDATION:    Follow-up chest CT in 2 months as above.    Electronically signed by:  Tera Angel M.D.    6/3/2022 12:58 AM Mountain Time    XR Chest 1 View [[id number]] Collected: 06/03/22 0159     Updated: 06/03/22 0200    Narrative:      EXAMINATION: XR CHEST 1 VW    DATE: 6/3/2022 12:30 AM    INDICATION:  Dyspnea and fatigue;    COMPARISON:  January 22, 2005    FINDINGS:      No focal consolidation, pleural effusion, or pneumothorax.    Cardiomediastinal silhouette  unremarkable.    No acute osseous abnormality.          Impression:        1.  No acute cardiopulmonary process.          Electronically signed by:  Bridgett Nina M.D.    6/2/2022 11:59 PM Mountain Time          Rishi Sampson [facility]    Pharmacist   Pharmacy   Progress Notes      Signed   Date of Service:  06/03/22 0702   Creation Time:  06/03/22 0702              Signed            Pharmacy Consult-Vancomycin Dosing  Regine Alfred is a  57 y.o. female receiving vancomycin therapy.      Indication: Pneumonia; fever  Consulting Provider: Aleksandra RUBIO  ID Consult:      Goal AUC: 400 - 600 mg/L*hr     Current Antimicrobial Therapy  Anti-Infectives (From admission, onward)        Ordered     Dose/Rate Route Frequency Start Stop     06/03/22 1228    "vancomycin (VANCOCIN) 1000 mg/200 mL dextrose 5% IVPB        Ordering Provider: Rishi Sampson, [facility]    1,000 mg Intravenous Every 12 Hours 06/03/22 1800 06/10/22 1759     06/03/22 0640   piperacillin-tazobactam (ZOSYN) 3.375 g in iso-osmotic dextrose 50 ml (premix)        Ordering Provider: Aleksandra Christopher APRN    3.375 g  over 4 Hours Intravenous Every 8 Hours Scheduled 06/03/22 1030 06/07/22 0959     06/03/22 0640   Pharmacy to dose vancomycin        Ordering Provider: Aleksandra Christopher APRN   \"And\" Linked Group Details      Does not apply Continuous PRN 06/03/22 0640 06/07/22 0639     06/03/22 0331   vancomycin 1250 mg/250 mL 0.9% NS IVPB ([facility])        Ordering Provider: Brandon Thompson MD    20 mg/kg × 68 kg  over 75 Minutes Intravenous Once 06/03/22 0333 06/03/22 0613     06/03/22 0331   piperacillin-tazobactam (ZOSYN) 4.5 g in iso-osmotic dextrose 100 mL IVPB (premix)        Ordering Provider: Brandon Thompson MD    4.5 g  over 30 Minutes Intravenous Once 06/03/22 0333 06/03/22 0458                Allergies  Allergies as of 06/03/2022 - Reviewed 06/03/2022   Allergen Reaction Noted   • Novocain [procaine] Itching 02/06/2020         Labs          Results from last 7 days   Lab Units 06/03/22  0019   BUN mg/dL 11   CREATININE mg/dL 0.67              Results from last 7 days   Lab Units 06/03/22  0019   WBC 10*3/mm3 7.21         Evaluation of Dosing     Last Dose Received in the ED/Outside Facility: Vancomycin 1250 mg IV x 1 (6/3/22 04:59)  Is Patient on Dialysis or Renal Replacement:      Ht - 177.8 cm (70\")  Wt - 68 kg (150 lb)     Estimated Creatinine Clearance: 99.4 mL/min (by C-G formula based on SCr of 0.67 mg/dL).     Intake & Output (last 3 days)           05/31 0701 06/01 0700 06/01 0701 06/02 0700 06/02 0701 06/03 0700     IV Piggyback     100     Total Intake(mL/kg)     100 (1.5)     Net     +100                            Microbiology and Radiology  Microbiology Results (last 10 days)         " Procedure Component Value - Date/Time     COVID PRE-OP / PRE-PROCEDURE SCREENING ORDER (NO ISOLATION) - Swab, Nasopharynx [[id number]]  (Normal) Collected: 06/03/22 0510     Lab Status: Final result Specimen: Swab from Nasopharynx Updated: 06/03/22 0551     Narrative:       The following orders were created for panel order COVID PRE-OP / PRE-PROCEDURE SCREENING ORDER (NO ISOLATION) - Swab, Nasopharynx.  Procedure                               Abnormality         Status                     ---------                               -----------         ------                     COVID-19 and FLU A/B PCR...[[id number]]  Normal              Final result                  Please view results for these tests on the individual orders.     COVID-19 and FLU A/B PCR - Swab, Nasopharynx [[id number]]  (Normal) Collected: 06/03/22 0510     Lab Status: Final result Specimen: Swab from Nasopharynx Updated: 06/03/22 0551       COVID19 Not Detected       Influenza A PCR Not Detected       Influenza B PCR Not Detected     Narrative:       Fact sheet for providers: https://www.fda.gov/media/092218/download     Fact sheet for patients: https://www.fda.gov/media/105432/download     Test performed by PCR.                Reported Vancomycin Levels                             InsightRX AUC Calculation:     Current AUC: 0 mg/L*hr     Predicted Steady State AUC on Current Dose: 512 mg/L*hr (1000 mg Q12H)  _________________________________     Predicted Steady State AUC on New Dose:   mg/L*hr     Assessment/Plan:   Vancomycin 1250 mg IV x 1 (given), then Vancomycin 1000 mg IV Q12H (15 mg/kg/dose)  Vancomycin level before 4th dose (18:00 dose on Saturday 6/4/22)  MRSA Screen PCR ordered  Pharmacy to follow     Rishi Sampson [facility]  6/3/22 07:00                     Study Result    Narrative & Impression   EXAMINATION: CT ANGIOGRAM CHEST WITH IV CONTRAST        INDICATION: Covid, shortness of breath, tachycardia, hypoxia     COMPARISON: Radiograph from  today     PROCEDURE: Multiple axial CT images were obtained of the chest after IV administration of contrast.  Coronal and sagittal reformations as well as MIP images were obtained. CT dose lowering techniques were used, to include: automated exposure control,   adjustment for patient size, and or use of iterative reconstruction.     FINDINGS:     Cardiovascular: No filling defects are seen in the pulmonary arteries. No focal aortic aneurysm or evidence of aortic dissection is seen. There are relatively extensive calcified and noncalcified atherosclerotic plaques in the aorta and branching vessels   including in the coronary arteries.     Mediastinum/Dian: Small sliding-type hiatal hernia. There are mildly prominent mediastinal/hilar lymph nodes, some of which are partially calcified.     Lungs/Pleura :  No pneumothorax or pleural fluid collection. There is heterogeneous opacity in the posterior medial right lung base.     Chest wall and Axilla: Unremarkable.     Bones:  No acute focal bony abnormality seen.     Upper abdomen: No focal abnormalities seen.     IMPRESSION:     No CTA evidence of pulmonary embolism or acute aortic pathology.     Heterogeneous opacity in the posterior medial right lung base, concerning for pneumonia. An evolving infarct could potentially have this appearance as well, though no definite pulmonary embolism seen at this site. A follow-up chest CT is recommended in 2   months to ensure complete clearance of this and to exclude an underlying lesion.     Relatively extensive atherosclerosis including coronary calcifications.     Small sliding-type hiatal hernia.     RECOMMENDATION:     Follow-up chest CT in 2 months as above.     Electronically signed by:  Tera Angel M.D.       Study Result    Narrative & Impression   EXAMINATION: XR CHEST 1 VW     DATE: 6/3/2022 12:30 AM     INDICATION:  Dyspnea and fatigue;     COMPARISON:  January 22, 2005     FINDINGS:        No focal consolidation,  pleural effusion, or pneumothorax.     Cardiomediastinal silhouette  unremarkable.     No acute osseous abnormality.           IMPRESSION:     1.  No acute cardiopulmonary process.              Electronically signed by:  Bridgett Nina M.D.    2022 11:59 PM Mountain Time                               Aleksandra Christopher APRN    Nurse Practitioner   Hospitalist   H&P      Incomplete   Date of Service:  22   Creation Time:  22              Incomplete        Expand All Collapse All[]Expand All by Default           [facility]  HISTORY AND PHYSICAL     Patient Name: Regine Alfred  : 1965  MRN: [id number]  Primary Care Physician: Leticia Reyes APRN  Date of admission: 6/3/2022        Subjective      Subjective      Chief Complaint:  Shortness of air      HPI:  Regine Alfred is a 57 y.o. female with a past medical history significant for CAD, arthritis, anxiety, hyperlipidemia, PVD and tobacco use presents to the ED with shortness of air.  Patient reports being hospitalized with COVID-19 3-4 weeks ago at [facility].  She reports receiving Remdesivir.  Since being discharged home she reports initially feeling ok but over the past 3-4 days she has had worsening exertional dyspnea.  She additionally reports dealing with a lot of stress lately and thought initially her symptoms were secondary to the stress.  She denies any known fever, chills, nausea, vomiting, diarrhea, abdominal pain or chest pain.  She does report a chronic ongoing non-productive cough.  She states this is her second time having COVID-19 and is unvaccinated.  CTA of chest obtained tonight reveals opacity in the posterior medial right lung base concerning for pneumonia.  An evolving infarct could potentially have this appearance as well, though no definite pulmonary embolism is seen at this site.  Patient is currently on 2.5L NC with oxygen saturation of  96%.  Patient will be admitted to [facility] under the care of the Hospitalist for further evaluation and treatment.             Review of Systems   Constitutional: Positive for activity change. Negative for appetite change, chills, diaphoresis, fatigue, fever and unexpected weight change.   HENT: Negative.    Eyes: Negative for photophobia and visual disturbance.   Respiratory: Positive for cough and shortness of breath.    Cardiovascular: Negative for chest pain, palpitations and leg swelling.   Gastrointestinal: Negative for abdominal distention, abdominal pain, blood in stool, constipation, diarrhea, nausea and vomiting.   Genitourinary: Negative.    Musculoskeletal: Negative for neck pain and neck stiffness.   Skin: Negative.    Neurological: Negative.    Psychiatric/Behavioral: Negative.         All other systems reviewed and are negative.      Personal History      Medical History        Past Medical History:   Diagnosis Date   • Anxiety     • Arthritis     • Colon polyp     • Easy bruising     • Glaucoma     • H/O blood clots     • Heart attack (CMS/HCC)     • History of chicken pox                       Surgical History         Past Surgical History:   Procedure Laterality Date   • COLONOSCOPY       • CORONARY STENT PLACEMENT                Family History: family history includes Heart attack in her sister; Heart disease in her brother, brother, father, mother, and sister. Otherwise pertinent FHx was reviewed and unremarkable.      Social History:  reports that she has been smoking cigarettes. She has been smoking about 1.00 pack per day. She has never used smokeless tobacco. She reports previous alcohol use. She reports previous drug use.  Social History          Social History Narrative   • Not on file         Medications:  DULoxetine, Vitamin D3, alendronate, aspirin, brimonidine, celecoxib, clopidogrel, folic acid, methocarbamol, ondansetron, rosuvastatin, tiZANidine, and traMADol           Allergies    Allergen Reactions   • Novocain [Procaine] Itching                          Objective      Objective      Vital Signs:   Temp:  [98.3 °F (36.8 °C)] 98.3 °F (36.8 °C)  Heart Rate:  [105-122] 105  Resp:  [16-18] 17  BP: (138-175)/() 175/84  Flow (L/min):  [2] 2     Physical Exam  Vitals and nursing note reviewed.   Constitutional:       General: She is not in acute distress.     Appearance: Normal appearance. She is not ill-appearing, toxic-appearing or diaphoretic.   HENT:      Head: Normocephalic and atraumatic.      Nose: Nose normal.      Mouth/Throat:      Mouth: Mucous membranes are dry.      Pharynx: Oropharynx is clear.   Eyes:      Extraocular Movements: Extraocular movements intact.      Conjunctiva/sclera: Conjunctivae normal.      Pupils: Pupils are equal, round, and reactive to light.   Neck:      Vascular: Carotid bruit present.   Cardiovascular:      Rate and Rhythm: Normal rate and regular rhythm.      Pulses: Normal pulses.      Heart sounds: Normal heart sounds.   Pulmonary:      Effort: Pulmonary effort is normal.      Breath sounds: Wheezing present.   Abdominal:      General: Bowel sounds are normal. There is no distension.      Palpations: Abdomen is soft. There is no mass.      Tenderness: There is no abdominal tenderness. There is no right CVA tenderness, left CVA tenderness, guarding or rebound.      Hernia: No hernia is present.   Musculoskeletal:         General: No swelling, tenderness, deformity or signs of injury. Normal range of motion.      Cervical back: Normal range of motion and neck supple.      Right lower leg: No edema.      Left lower leg: No edema.   Skin:     General: Skin is warm and dry.   Neurological:      General: No focal deficit present.      Mental Status: She is alert and oriented to person, place, and time. Mental status is at baseline.   Psychiatric:         Mood and Affect: Mood normal.         Behavior: Behavior normal.         Thought Content: Thought  content normal.         Judgment: Judgment normal.            Results Reviewed:  I have personally reviewed most recent indicated data and agree with findings including:  [x]?  Laboratory  [x]?  Radiology  [x]?  EKG/Telemetry  []?  Pathology  []?  Cardiac/Vascular Studies  []?  Old records  []?  Other:  Most pertinent findings include:        LAB RESULTS:           Lab 06/03/22  0203 06/03/22  0019   WBC  --  7.21   HEMOGLOBIN  --  11.4*   HEMATOCRIT  --  36.6   PLATELETS  --  339   NEUTROS ABS  --  5.46   IMMATURE GRANS (ABS)  --  0.02   LYMPHS ABS  --  0.64*   MONOS ABS  --  0.37   EOS ABS  --  0.67*   MCV  --  93.8   D DIMER QUANT 1.47*  --           Lab 06/03/22  0019   SODIUM 135*   POTASSIUM 4.3   CHLORIDE 100   CO2 23.0   ANION GAP 12.0   BUN 11   CREATININE 0.67   EGFR 102.1   GLUCOSE 97   CALCIUM 9.1              Lab 06/03/22  0019   TOTAL PROTEIN 6.9   ALBUMIN 2.80*   GLOBULIN 4.1   ALT (SGPT) 10   AST (SGOT) 23   BILIRUBIN 0.2   ALK PHOS 78              Lab 06/03/22  0019   PROBNP 794.1                      Brief Urine Lab Results      None              Microbiology Results (last 10 days)      ** No results found for the last 240 hours. **               Radiology results from the last 24 hours:   XR Chest 1 View     Result Date: 6/3/2022  EXAMINATION: XR CHEST 1 VW DATE: 6/3/2022 12:30 AM INDICATION:  Dyspnea and fatigue; COMPARISON:  January 22, 2005 FINDINGS: No focal consolidation, pleural effusion, or pneumothorax. Cardiomediastinal silhouette  unremarkable. No acute osseous abnormality.      Impression: 1.  No acute cardiopulmonary process. Electronically signed by:  Bridgett Nina M.D.  6/2/2022 11:59 PM Mountain Time     CT Angiogram Chest     Result Date: 6/3/2022  EXAMINATION: CT ANGIOGRAM CHEST WITH IV CONTRAST   INDICATION: Covid, shortness of breath, tachycardia, hypoxia COMPARISON: Radiograph from today PROCEDURE: Multiple axial CT images were obtained of the chest after IV administration of  contrast.  Coronal and sagittal reformations as well as MIP images were obtained. CT dose lowering techniques were used, to include: automated exposure control, adjustment for patient size, and or use of iterative reconstruction. FINDINGS: Cardiovascular: No filling defects are seen in the pulmonary arteries. No focal aortic aneurysm or evidence of aortic dissection is seen. There are relatively extensive calcified and noncalcified atherosclerotic plaques in the aorta and branching vessels  including in the coronary arteries. Mediastinum/Dian: Small sliding-type hiatal hernia. There are mildly prominent mediastinal/hilar lymph nodes, some of which are partially calcified. Lungs/Pleura :  No pneumothorax or pleural fluid collection. There is heterogeneous opacity in the posterior medial right lung base. Chest wall and Axilla: Unremarkable. Bones:  No acute focal bony abnormality seen. Upper abdomen: No focal abnormalities seen.      Impression: No CTA evidence of pulmonary embolism or acute aortic pathology. Heterogeneous opacity in the posterior medial right lung base, concerning for pneumonia. An evolving infarct could potentially have this appearance as well, though no definite pulmonary embolism seen at this site. A follow-up chest CT is recommended in 2  months to ensure complete clearance of this and to exclude an underlying lesion. Relatively extensive atherosclerosis including coronary calcifications. Small sliding-type hiatal hernia. RECOMMENDATION: Follow-up chest CT in 2 months as above. Electronically signed by:  Tera Angel M.D.  6/3/2022 12:58 AM Mountain Time         Results for orders placed during the hospital encounter of 02/25/20     Adult Transthoracic Echo Complete W/ Cont if Necessary Per Protocol     Interpretation Summary  · Estimated EF = 65%.  · Left ventricular systolic function is normal.  · Mild tricuspid valve regurgitation is present.  · Left ventricular diastolic function is  "normal.  · Normal right ventricular cavity size, wall thickness, systolic function and septal motion noted.  · No evidence of pulmonary hypertension is present.  · There is no evidence of pericardial effusion.  · No significant structural valvular abnormality demonstrated.              Assessment & Plan     Assessment & Plan        Pneumonia    Hyperlipidemia LDL goal <100    Tobacco use    Bilateral carotid bruits        57 year old female presents to the ED with exertional dyspnea over the past 3-4 days.      1) Pneumonia  -CTA of chest mentioned above  -d-dimer 1.47  -will give dose of therapeutic lovenox since ? Evolving infarct on CTA and recent covid-19, have pharmacy to dose   -continue vancomycin and zosyn   -check MRSA PCR  -blood cultures x2   -sputum culture  -check urine antigens  -continuous pulse ox  -keep o2 sat >90%  -duo-nebs scheduled and prn      2) hyperlipidemia  -continue statin      3) Bilateral carotid stenosis   -reports future appointment with vascular soon   -on DAPT      4) tobacco use  -cessation education provided   -nicotine patch      5) Mood disorder  -continue cymbalta      DVT prophylaxis:  lovenox     CODE STATUS:  Full code      This note has been completed as part of a split-shared workflow.      Signature: Electronically signed by JOANNE Abernathy, 06/03/22, 4:24 AM EDT.                                            ED to Hosp-Admission (Current) on 6/3/2022     ED to Hosp-Admission (Current) on 6/3/2022        Detailed Report        ROS Info           Additional Documentation    Vitals:  /75 (BP Location: Right arm, Patient Position: Lying)   Pulse 104   Temp 100.6 °F (38.1 °C) Important   (Oral)   Resp 20   Ht 177.8 cm (70\")   Wt 68 kg (150 lb)   SpO2 94%   BMI 21.52 kg/m²   BSA 1.85 m²      More Vitals   Flowsheets:  Vital Signs,   HPI,   Acuity/Destination,   Triage Sepsis Screen,   Travel, Exposure, and Communicable Disease Screening,   Outbreak Screen,   Candida " auris Outbreak,   Pain,   [place] Suicide Severity Rating Scale Past Month (C-SSRS Screen Version),   Violence Risk Screen,   Abuse Screen,   Fall Risk Assessment,   Respiratory,   Peripheral Neurovascular,   Triage Assessment,   Sepsis Predictive Model,   Patient Radiology Status,   Cardiac Monitoring,   Cardiac,   Safety,   All LDAs,   Falls PA Model,   Pain,   Blood Specimen Collection Status,   ED Quick Updates,   Intake/Output,   Other Assessments,   Care Handoff,   Healthcare Directives,   Audit-C,   Functional Level Screening,   D/C Planning,   Adult Patient Profile,   Vaccination Screening,   Nutrition Screen,   Patient Belongings,   Pressure Ulcer Screening,   Readmission Risk Score,   Sepsis Screening Tool,   Jesus Scale,   Psychosocial Review,   MEWS score,   Workload Acuity,   PF RATIO,   Adult PCS Body System,   Discharge Plan,   Respiratory Therapy Flowsheet,   Daily care/ Safety,   Vitals, Intake and Output,   Vitals Reassessment,   MCG Next Review Date   ...(47 more)   Encounter Info:  Billing Info,   History,   Allergies,   Detailed Report              Patient Summary Extracts    Lab Result Report 6/3/2022 12:49 PM       Encounter Information    Encounter Information    Department Encounter #   6/3/2022 12:55 AM  [facility] 5F [id number]                 Care Timeline    06/03   Admitted from ED 0533         Clinical Impressions       Acute sepsis (HCC)     Pneumonia due to infectious organism, unspecified laterality, unspecified part of lung     Hypoxia     Smoker      Disposition       Decision to Admit       Orders Placed       Labs     Basic Metabolic Panel Morning Draw     CBC & Differential Once     CBC Auto Differential Morning Draw     Comprehensive Metabolic Panel Once     D-dimer, Quantitative Once     BNP Once     COVID PRE-OP / PRE-PROCEDURE SCREENING ORDER (NO ISOLATION) - Swab, Nasopharynx Once     Lactic Acid, Plasma Once     MRSA Screen, PCR (Inpatient) - Swab, Nares  Once     Procalcitonin STAT     Beeler Draw Once     Troponin Once     Blood Culture - Blood, Once     Blood Culture - Blood, Blood, Venous Line Once   Blood Gas, Arterial -With Co-Ox Panel: Yes As Needed   Legionella Antigen, Urine - Urine, Urine, Clean Catch Once   Respiratory Culture - Sputum, Cough Once   S. Pneumo Ag Urine or CSF - Urine, Urine, Clean Catch Once   Vancomycin, Trough Timed   ...(17 more)     Imaging     CT Angiogram Chest 1 Time Imaging     XR Chest 1 View 1 Time Imaging        Other Orders     ECG 12 Lead Once     Duplex Venous Lower Extremity - Bilateral CAR Once   Cardiac monitoring Continuous   Code Status and Medical Interventions: Continuous   Continuous Pulse Oximetry Continuous   Daily Weights Daily   Diet Regular; Cardiac Diet Effective Now   Fall Precautions Continuous   Follow Standard Precautions Once   Incentive Spirometry Every 4 Hours While Awake   Inpatient Admission Once   Insert peripheral IV Once   Insert Peripheral IV Once   Intake & Output Every Shift   Maintain Sequential Compression Device Continuous   Notify Provider if Patient Requires Greater Than 4LPM of Oxygen Until Discontinued   Nurse to Call MD or Nutrition Services for Diet if Patient Passes Dysphagia Screen Once   Nursing Dysphagia Screening (Complete Prior to Giving Anything By Mouth) Once   Oscillating Positive Expiratory Pressure (OPEP) Once   Oxygen Therapy- Nasal Cannula; 2 LPM; Titrate for SPO2: equal to or greater than, 92% Continuous PRN   Patient Isolation Contact and Airborne Continuous   Place Sequential Compression Device Once   Pneumonia Education Once   Pneumonia Finding Seen on CXR Once   Pulse Oximetry on Admit Once   RN to Place Order SLP Consult - Eval & Treat Choosing Reason of RN Dysphagia Screen Failed Per Order Details   Strict Intake & Output Every Hour   Tobacco Cessation Education Once   Undress and Gown Once   Up With Assistance As Needed   Vital Signs Per Hospital Policy   Vital Signs  Every 4 Hours   Weigh Patient Once   ...(31 more)     All Encounter Results         Care Timeline    0005   Arrived   0014   ECG 12 Lead   0019   Comprehensive Metabolic Panel      BNP     CBC & Differential    0048   XR Chest 1 View   0203   D-dimer, Quantitative    0214   Morphine Sulfate 4 mg   0215   Ondansetron HCl 4 mg   0232   Iopamidol 80 mL     CT Angiogram Chest   0355   Blood Culture - Blood, Arm, Right   0359   Blood Culture - Blood, Hand, Right   0401   Lactic Acid, Plasma     Troponin     Procalcitonin   0423   Piperacillin-Tazobactam in Dex 4.5 g   0452   Admitted (ED Boarder)   0458   Vancomycin HCl 1250 mg     Enoxaparin Sodium 70 mg   0510   COVID PRE-OP / PRE-PROCEDURE SCREENING ORDER (NO ISOLATION) - Swab, Nasopharynx   0533   Transferred to [facility] 5F       Medications Administered     All Medications Administered (16)      Visit Diagnoses       Acute sepsis (HCC)     Pneumonia due to infectious organism, unspecified laterality, unspecified part of lung     Hypoxia     Smoker     Problem List             Media  From this encounter  Electronic signature on 6/3/2022 0023 - E-signed   Scan on 6/3/2022 0025 by Katelynn Vail RegSched Rep       Committee Details    There is no Committee Review information to display for this encounter.

## 2022-06-03 NOTE — H&P
[facility] Medicine Services  HISTORY AND PHYSICAL    Patient Name: Regine Alfred  : 1965  MRN: [id number]  Primary Care Physician: Leticia Reyes APRN  Date of admission: 6/3/2022    Subjective   Subjective     Chief Complaint:  Shortness of air     HPI:  Regine Alfred is a 57 y.o. female with a past medical history significant for CAD, arthritis, anxiety, hyperlipidemia, PVD and tobacco use presents to the ED with shortness of air.  Patient reports being hospitalized with COVID-19 3-4 weeks ago at [facility].  She reports receiving Remdesivir.  Since being discharged home she reports initially feeling ok but over the past 3-4 days she has had worsening exertional dyspnea.  She additionally reports dealing with a lot of stress lately and thought initially her symptoms were secondary to the stress.  She denies any known fever, chills, nausea, vomiting, diarrhea, abdominal pain or chest pain.  She does report a chronic ongoing non-productive cough.  She states this is her second time having COVID-19 and is unvaccinated.  CTA of chest obtained tonight reveals opacity in the posterior medial right lung base concerning for pneumonia.  An evolving infarct could potentially have this appearance as well, though no definite pulmonary embolism is seen at this site.  Patient is currently on 2.5L NC with oxygen saturation of 96%.  Patient will be admitted to [facility] under the care of the Hospitalist for further evaluation and treatment.          Review of Systems   Constitutional: Positive for activity change. Negative for appetite change, chills, diaphoresis, fatigue, fever and unexpected weight change.   HENT: Negative.    Eyes: Negative for photophobia and visual disturbance.   Respiratory: Positive for cough and shortness of breath.    Cardiovascular: Negative for chest pain, palpitations and leg swelling.   Gastrointestinal: Negative for abdominal distention,  abdominal pain, blood in stool, constipation, diarrhea, nausea and vomiting.   Genitourinary: Negative.    Musculoskeletal: Negative for neck pain and neck stiffness.   Skin: Negative.    Neurological: Negative.    Psychiatric/Behavioral: Negative.       All other systems reviewed and are negative.     Personal History     Past Medical History:   Diagnosis Date   • Anxiety    • Arthritis    • Colon polyp    • Easy bruising    • Glaucoma    • H/O blood clots    • Heart attack (CMS/HCC)    • History of chicken pox              Past Surgical History:   Procedure Laterality Date   • COLONOSCOPY     • CORONARY STENT PLACEMENT         Family History:  family history includes Heart attack in her sister; Heart disease in her brother, brother, father, mother, and sister. Otherwise pertinent FHx was reviewed and unremarkable.     Social History:  reports that she has been smoking cigarettes. She has been smoking about 1.00 pack per day. She has never used smokeless tobacco. She reports previous alcohol use. She reports previous drug use.  Social History     Social History Narrative   • Not on file       Medications:  DULoxetine, Vitamin D3, alendronate, aspirin, brimonidine, celecoxib, clopidogrel, folic acid, methocarbamol, ondansetron, rosuvastatin, tiZANidine, and traMADol    Allergies   Allergen Reactions   • Novocain [Procaine] Itching              Objective   Objective     Vital Signs:   Temp:  [98.3 °F (36.8 °C)] 98.3 °F (36.8 °C)  Heart Rate:  [105-122] 105  Resp:  [16-18] 17  BP: (138-175)/() 175/84  Flow (L/min):  [2] 2    Physical Exam  Vitals and nursing note reviewed.   Constitutional:       General: She is not in acute distress.     Appearance: Normal appearance. She is not ill-appearing, toxic-appearing or diaphoretic.   HENT:      Head: Normocephalic and atraumatic.      Nose: Nose normal.      Mouth/Throat:      Mouth: Mucous membranes are dry.      Pharynx: Oropharynx is clear.   Eyes:      Extraocular  Movements: Extraocular movements intact.      Conjunctiva/sclera: Conjunctivae normal.      Pupils: Pupils are equal, round, and reactive to light.   Neck:      Vascular: Carotid bruit present.   Cardiovascular:      Rate and Rhythm: Normal rate and regular rhythm.      Pulses: Normal pulses.      Heart sounds: Normal heart sounds.   Pulmonary:      Effort: Pulmonary effort is normal.      Breath sounds: Wheezing present.   Abdominal:      General: Bowel sounds are normal. There is no distension.      Palpations: Abdomen is soft. There is no mass.      Tenderness: There is no abdominal tenderness. There is no right CVA tenderness, left CVA tenderness, guarding or rebound.      Hernia: No hernia is present.   Musculoskeletal:         General: No swelling, tenderness, deformity or signs of injury. Normal range of motion.      Cervical back: Normal range of motion and neck supple.      Right lower leg: No edema.      Left lower leg: No edema.   Skin:     General: Skin is warm and dry.   Neurological:      General: No focal deficit present.      Mental Status: She is alert and oriented to person, place, and time. Mental status is at baseline.   Psychiatric:         Mood and Affect: Mood normal.         Behavior: Behavior normal.         Thought Content: Thought content normal.         Judgment: Judgment normal.          Results Reviewed:  I have personally reviewed most recent indicated data and agree with findings including:  [x]  Laboratory  [x]  Radiology  [x]  EKG/Telemetry  []  Pathology  []  Cardiac/Vascular Studies  []  Old records  []  Other:  Most pertinent findings include:      LAB RESULTS:      Lab 06/03/22  0203 06/03/22 0019   WBC  --  7.21   HEMOGLOBIN  --  11.4*   HEMATOCRIT  --  36.6   PLATELETS  --  339   NEUTROS ABS  --  5.46   IMMATURE GRANS (ABS)  --  0.02   LYMPHS ABS  --  0.64*   MONOS ABS  --  0.37   EOS ABS  --  0.67*   MCV  --  93.8   D DIMER QUANT 1.47*  --          Lab 06/03/22  0019    SODIUM 135*   POTASSIUM 4.3   CHLORIDE 100   CO2 23.0   ANION GAP 12.0   BUN 11   CREATININE 0.67   EGFR 102.1   GLUCOSE 97   CALCIUM 9.1         Lab 06/03/22  0019   TOTAL PROTEIN 6.9   ALBUMIN 2.80*   GLOBULIN 4.1   ALT (SGPT) 10   AST (SGOT) 23   BILIRUBIN 0.2   ALK PHOS 78         Lab 06/03/22  0019   PROBNP 794.1                 Brief Urine Lab Results     None        Microbiology Results (last 10 days)     ** No results found for the last 240 hours. **          XR Chest 1 View    Result Date: 6/3/2022  EXAMINATION: XR CHEST 1 VW DATE: 6/3/2022 12:30 AM INDICATION:  Dyspnea and fatigue; COMPARISON:  January 22, 2005 FINDINGS: No focal consolidation, pleural effusion, or pneumothorax. Cardiomediastinal silhouette  unremarkable. No acute osseous abnormality.     Impression: 1.  No acute cardiopulmonary process. Electronically signed by:  Bridgett Nina M.D.  6/2/2022 11:59 PM Mountain Time    CT Angiogram Chest    Result Date: 6/3/2022  EXAMINATION: CT ANGIOGRAM CHEST WITH IV CONTRAST   INDICATION: Covid, shortness of breath, tachycardia, hypoxia COMPARISON: Radiograph from today PROCEDURE: Multiple axial CT images were obtained of the chest after IV administration of contrast.  Coronal and sagittal reformations as well as MIP images were obtained. CT dose lowering techniques were used, to include: automated exposure control, adjustment for patient size, and or use of iterative reconstruction. FINDINGS: Cardiovascular: No filling defects are seen in the pulmonary arteries. No focal aortic aneurysm or evidence of aortic dissection is seen. There are relatively extensive calcified and noncalcified atherosclerotic plaques in the aorta and branching vessels  including in the coronary arteries. Mediastinum/Dian: Small sliding-type hiatal hernia. There are mildly prominent mediastinal/hilar lymph nodes, some of which are partially calcified. Lungs/Pleura :  No pneumothorax or pleural fluid collection. There is  heterogeneous opacity in the posterior medial right lung base. Chest wall and Axilla: Unremarkable. Bones:  No acute focal bony abnormality seen. Upper abdomen: No focal abnormalities seen.     Impression: No CTA evidence of pulmonary embolism or acute aortic pathology. Heterogeneous opacity in the posterior medial right lung base, concerning for pneumonia. An evolving infarct could potentially have this appearance as well, though no definite pulmonary embolism seen at this site. A follow-up chest CT is recommended in 2  months to ensure complete clearance of this and to exclude an underlying lesion. Relatively extensive atherosclerosis including coronary calcifications. Small sliding-type hiatal hernia. RECOMMENDATION: Follow-up chest CT in 2 months as above. Electronically signed by:  Tera Angel M.D.  6/3/2022 12:58 AM Mountain Time      Results for orders placed during the hospital encounter of 02/25/20    Adult Transthoracic Echo Complete W/ Cont if Necessary Per Protocol    Interpretation Summary  · Estimated EF = 65%.  · Left ventricular systolic function is normal.  · Mild tricuspid valve regurgitation is present.  · Left ventricular diastolic function is normal.  · Normal right ventricular cavity size, wall thickness, systolic function and septal motion noted.  · No evidence of pulmonary hypertension is present.  · There is no evidence of pericardial effusion.  · No significant structural valvular abnormality demonstrated.      Assessment & Plan   Assessment & Plan       Pneumonia    Hyperlipidemia LDL goal <100    Tobacco use    Bilateral carotid bruits      57 year old female presents to the ED with exertional dyspnea over the past 3-4 days.  She was found to have RLL pneumonia on CT chest.    RLL pneumonia  -CTA of chest negative for PE  -Continue zosyn, D/c vancomycin with negative MRSA PCR.  Can likely transition to PO Augmentin in next 1-2 days if improving  -Blood and sputum cultures, urinary  antigens pending   -Continue duonebs  -Wean O2 as tolerated  -Flutter valve  -Will get SLP consult to evaluate for aspiration  -Recommend follow up CT chest outpatient in 2-3 months.  Discussed with patient.    Elevated D-dimer  -No PE  -Venous duplex negative preliminarily  -No need for full anticoagulation    Hyperlipidemia  Bilateral carotid stenosis  -Continue statin, DAPT  -Outpatient appointment with vascular pending     Tobacco use  -cessation education provided   -nicotine patch     Mood disorder  -continue cymbalta     DVT prophylaxis:  lovenox    CODE STATUS:  Full code        This note has been completed as part of a split-shared workflow.     Signature: Electronically signed by JOANNE Abernathy, 06/03/22, 4:24 AM EDT.      Attending   Admission Attestation       I have seen and examined the patient, performing an independent face-to-face diagnostic evaluation with plan of care reviewed and developed with the advanced practice clinician (APC).      Brief Summary Statement:   Regine Alfred is a 57 y.o. female with ongoing tobacco abuse, vascular disease who recently had COVID 3-4 weeks ago, presenting with several days of increased shortness of breath.  She denies any vomiting.  She was found to have RLL pneumonia on CT and admission was requested.  COVID was negative.  She had a low grade fever after admission.  She reports already feeling better after initiation of antibiotics and IV fluids.    Remainder of detailed HPI is as noted by APC and has been reviewed and/or edited by me for completeness.    Attending Physical Exam:  Constitutional: Awake, alert, laying in bed  Eyes: Sclerae anicteric, no conjunctival injection  HENT: NCAT, mucous membranes moist  Neck: Supple, trachea midline  Respiratory: RLL crackles, nonlabored respirations on nasal cannula  Cardiovascular: RRR, no murmurs, rubs, or gallops bilaterally  Gastrointestinal: Soft, nontender, nondistended  Musculoskeletal: No bilateral  ankle edema, no clubbing or cyanosis to extremities  Psychiatric: Appropriate affect, cooperative  Neurologic: Speech clear and fluent  Skin: No rashes on exposed surfaces    Brief Assessment/Plan :  See detailed assessment and plan developed with APC which I have reviewed and/or edited for completeness.      Erinn Rodriguez MD  06/03/22

## 2022-06-04 LAB
ANION GAP SERPL CALCULATED.3IONS-SCNC: 9 MMOL/L (ref 5–15)
BASOPHILS # BLD AUTO: 0.03 10*3/MM3 (ref 0–0.2)
BASOPHILS NFR BLD AUTO: 0.7 % (ref 0–1.5)
BUN SERPL-MCNC: 8 MG/DL (ref 6–20)
BUN/CREAT SERPL: 13.6 (ref 7–25)
CALCIUM SPEC-SCNC: 8.6 MG/DL (ref 8.6–10.5)
CHLORIDE SERPL-SCNC: 100 MMOL/L (ref 98–107)
CO2 SERPL-SCNC: 28 MMOL/L (ref 22–29)
CREAT SERPL-MCNC: 0.59 MG/DL (ref 0.57–1)
DEPRECATED RDW RBC AUTO: 43.8 FL (ref 37–54)
EGFRCR SERPLBLD CKD-EPI 2021: 105.3 ML/MIN/1.73
EOSINOPHIL # BLD AUTO: 0.85 10*3/MM3 (ref 0–0.4)
EOSINOPHIL NFR BLD AUTO: 20.8 % (ref 0.3–6.2)
ERYTHROCYTE [DISTWIDTH] IN BLOOD BY AUTOMATED COUNT: 13.3 % (ref 12.3–15.4)
GLUCOSE SERPL-MCNC: 88 MG/DL (ref 65–99)
HCT VFR BLD AUTO: 34.3 % (ref 34–46.6)
HGB BLD-MCNC: 11.3 G/DL (ref 12–15.9)
IMM GRANULOCYTES # BLD AUTO: 0.01 10*3/MM3 (ref 0–0.05)
IMM GRANULOCYTES NFR BLD AUTO: 0.2 % (ref 0–0.5)
L PNEUMO1 AG UR QL IA: NEGATIVE
LYMPHOCYTES # BLD AUTO: 0.62 10*3/MM3 (ref 0.7–3.1)
LYMPHOCYTES NFR BLD AUTO: 15.2 % (ref 19.6–45.3)
MCH RBC QN AUTO: 29.7 PG (ref 26.6–33)
MCHC RBC AUTO-ENTMCNC: 32.9 G/DL (ref 31.5–35.7)
MCV RBC AUTO: 90 FL (ref 79–97)
MONOCYTES # BLD AUTO: 0.3 10*3/MM3 (ref 0.1–0.9)
MONOCYTES NFR BLD AUTO: 7.4 % (ref 5–12)
NEUTROPHILS NFR BLD AUTO: 2.27 10*3/MM3 (ref 1.7–7)
NEUTROPHILS NFR BLD AUTO: 55.7 % (ref 42.7–76)
NRBC BLD AUTO-RTO: 0 /100 WBC (ref 0–0.2)
PLATELET # BLD AUTO: 249 10*3/MM3 (ref 140–450)
PMV BLD AUTO: 9.2 FL (ref 6–12)
POTASSIUM SERPL-SCNC: 3.3 MMOL/L (ref 3.5–5.2)
RBC # BLD AUTO: 3.81 10*6/MM3 (ref 3.77–5.28)
S PNEUM AG SPEC QL LA: NEGATIVE
SODIUM SERPL-SCNC: 137 MMOL/L (ref 136–145)
WBC NRBC COR # BLD: 4.08 10*3/MM3 (ref 3.4–10.8)

## 2022-06-04 PROCEDURE — 85025 COMPLETE CBC W/AUTO DIFF WBC: CPT | Performed by: INTERNAL MEDICINE

## 2022-06-04 PROCEDURE — 87899 AGENT NOS ASSAY W/OPTIC: CPT | Performed by: NURSE PRACTITIONER

## 2022-06-04 PROCEDURE — 99232 SBSQ HOSP IP/OBS MODERATE 35: CPT | Performed by: INTERNAL MEDICINE

## 2022-06-04 PROCEDURE — 25010000002 PIPERACILLIN SOD-TAZOBACTAM PER 1 G: Performed by: NURSE PRACTITIONER

## 2022-06-04 PROCEDURE — 94799 UNLISTED PULMONARY SVC/PX: CPT

## 2022-06-04 PROCEDURE — 94640 AIRWAY INHALATION TREATMENT: CPT

## 2022-06-04 PROCEDURE — 94664 DEMO&/EVAL PT USE INHALER: CPT

## 2022-06-04 PROCEDURE — 25010000002 ENOXAPARIN PER 10 MG: Performed by: INTERNAL MEDICINE

## 2022-06-04 PROCEDURE — 87449 NOS EACH ORGANISM AG IA: CPT | Performed by: NURSE PRACTITIONER

## 2022-06-04 PROCEDURE — 80048 BASIC METABOLIC PNL TOTAL CA: CPT | Performed by: INTERNAL MEDICINE

## 2022-06-04 RX ORDER — ALBUTEROL SULFATE 90 UG/1
2 AEROSOL, METERED RESPIRATORY (INHALATION)
Status: DISCONTINUED | OUTPATIENT
Start: 2022-06-04 | End: 2022-06-05 | Stop reason: HOSPADM

## 2022-06-04 RX ORDER — POTASSIUM CHLORIDE 1.5 G/1.77G
40 POWDER, FOR SOLUTION ORAL AS NEEDED
Status: DISCONTINUED | OUTPATIENT
Start: 2022-06-04 | End: 2022-06-05 | Stop reason: HOSPADM

## 2022-06-04 RX ORDER — POTASSIUM CHLORIDE 750 MG/1
40 CAPSULE, EXTENDED RELEASE ORAL AS NEEDED
Status: DISCONTINUED | OUTPATIENT
Start: 2022-06-04 | End: 2022-06-05 | Stop reason: HOSPADM

## 2022-06-04 RX ADMIN — IPRATROPIUM BROMIDE 2 PUFF: 17 AEROSOL, METERED RESPIRATORY (INHALATION) at 12:57

## 2022-06-04 RX ADMIN — IPRATROPIUM BROMIDE 2 PUFF: 17 AEROSOL, METERED RESPIRATORY (INHALATION) at 20:15

## 2022-06-04 RX ADMIN — TAZOBACTAM SODIUM AND PIPERACILLIN SODIUM 3.38 G: 375; 3 INJECTION, SOLUTION INTRAVENOUS at 17:44

## 2022-06-04 RX ADMIN — ROSUVASTATIN 20 MG: 20 TABLET, FILM COATED ORAL at 09:26

## 2022-06-04 RX ADMIN — FOLIC ACID 1 MG: 1 TABLET ORAL at 09:27

## 2022-06-04 RX ADMIN — BRIMONIDINE TARTRATE 1 DROP: 2 SOLUTION/ DROPS OPHTHALMIC at 09:25

## 2022-06-04 RX ADMIN — Medication 10 ML: at 09:27

## 2022-06-04 RX ADMIN — TAZOBACTAM SODIUM AND PIPERACILLIN SODIUM 3.38 G: 375; 3 INJECTION, SOLUTION INTRAVENOUS at 09:25

## 2022-06-04 RX ADMIN — Medication 10 ML: at 21:28

## 2022-06-04 RX ADMIN — IPRATROPIUM BROMIDE 2 PUFF: 17 AEROSOL, METERED RESPIRATORY (INHALATION) at 16:47

## 2022-06-04 RX ADMIN — IPRATROPIUM BROMIDE AND ALBUTEROL SULFATE 3 ML: 2.5; .5 SOLUTION RESPIRATORY (INHALATION) at 09:05

## 2022-06-04 RX ADMIN — BRIMONIDINE TARTRATE 1 DROP: 2 SOLUTION/ DROPS OPHTHALMIC at 21:28

## 2022-06-04 RX ADMIN — ALBUTEROL SULFATE 2 PUFF: 90 AEROSOL, METERED RESPIRATORY (INHALATION) at 16:46

## 2022-06-04 RX ADMIN — DULOXETINE HYDROCHLORIDE 60 MG: 60 CAPSULE, DELAYED RELEASE ORAL at 09:26

## 2022-06-04 RX ADMIN — TAZOBACTAM SODIUM AND PIPERACILLIN SODIUM 3.38 G: 375; 3 INJECTION, SOLUTION INTRAVENOUS at 02:24

## 2022-06-04 RX ADMIN — POTASSIUM CHLORIDE 40 MEQ: 750 CAPSULE, EXTENDED RELEASE ORAL at 17:44

## 2022-06-04 RX ADMIN — POTASSIUM CHLORIDE 40 MEQ: 750 CAPSULE, EXTENDED RELEASE ORAL at 22:34

## 2022-06-04 RX ADMIN — CLOPIDOGREL BISULFATE 75 MG: 75 TABLET ORAL at 09:27

## 2022-06-04 RX ADMIN — ASPIRIN 81 MG: 81 TABLET, COATED ORAL at 09:27

## 2022-06-04 RX ADMIN — ALBUTEROL SULFATE 2 PUFF: 90 AEROSOL, METERED RESPIRATORY (INHALATION) at 12:57

## 2022-06-04 RX ADMIN — ALBUTEROL SULFATE 2 PUFF: 90 AEROSOL, METERED RESPIRATORY (INHALATION) at 20:15

## 2022-06-04 RX ADMIN — ENOXAPARIN SODIUM 40 MG: 40 INJECTION SUBCUTANEOUS at 09:26

## 2022-06-04 NOTE — PLAN OF CARE
Goal Outcome Evaluation:      Pt on room air reports SOA improving. VSS  stable. K+ replacement protocol started, pt will have another dose tonight and potassium replaced. Possible D/C tomorrow.

## 2022-06-04 NOTE — PROGRESS NOTES
Nutrition Services    Patient Name:  Regine Alfred  YOB: 1965  MRN: [id number]  Admit Date:  6/3/2022    Pt does not meet screen criteria for nutrition risk per reported wt/intake hx. Allows wt is stable and just does not eat a lot, especially not much at bfst. Would like to try Boost at bfst meal - ordered for her. Cont to follow per protocol     Electronically signed by:  Radha George RD  06/04/22 18:07 EDT

## 2022-06-04 NOTE — PROGRESS NOTES
Psychiatric Medicine Services  PROGRESS NOTE    Patient Name: Regine Alfred  : 1965  MRN: [id number]    Date of Admission: 6/3/2022  Primary Care Physician: Leticia Reyes APRN    Subjective   Subjective     CC:  Pneumonia    HPI:  Patient is still short of breath and coughing.  Was on 2 L overnight.    ROS:  General: denies fevers or chills  CV: denies chest pain  Resp: As per HPI  Abd: denies abd pain, nausea      Objective   Objective     Vital Signs:   Temp:  [98 °F (36.7 °C)-100.6 °F (38.1 °C)] 98.2 °F (36.8 °C)  Heart Rate:  [] 92  Resp:  [17-20] 18  BP: (121-161)/(63-91) 161/91  Flow (L/min):  [2] 2     Physical Exam:  Constitutional: No acute distress, awake, alert  Respiratory: Clear to auscultation bilaterally, respiratory effort normal with frequent coughing  Cardiovascular: RRR, no murmurs, rubs, or gallops  Gastrointestinal: Positive bowel sounds, soft, nontender, nondistended  Musculoskeletal: No bilateral ankle edema  Psychiatric: Appropriate affect, cooperative  Neurologic: Oriented x 3, no focal deficits  Skin: No rashes      Results Reviewed:  LAB RESULTS:      Lab 22  0302 22  0712 22  0401 22  0203 22  0019   WBC 4.08 6.42  --   --  7.21   HEMOGLOBIN 11.3* 11.7*  --   --  11.4*   HEMATOCRIT 34.3 35.7  --   --  36.6   PLATELETS 249 269  --   --  339   NEUTROS ABS 2.27 4.41  --   --  5.46   IMMATURE GRANS (ABS) 0.01 0.02  --   --  0.02   LYMPHS ABS 0.62* 0.83  --   --  0.64*   MONOS ABS 0.30 0.34  --   --  0.37   EOS ABS 0.85* 0.77*  --   --  0.67*   MCV 90.0 91.1  --   --  93.8   PROCALCITONIN  --   --  0.08  --   --    LACTATE  --   --  0.5  --   --    D DIMER QUANT  --   --   --  1.47*  --          Lab 22  0302 22  0712 22  0019   SODIUM 137 135* 135*   POTASSIUM 3.3* 3.5 4.3   CHLORIDE 100 99 100   CO2 28.0 27.0 23.0   ANION GAP 9.0 9.0 12.0   BUN 8 10 11   CREATININE 0.59 0.63 0.67   EGFR 105.3  103.6 102.1   GLUCOSE 88 92 97   CALCIUM 8.6 8.7 9.1         Lab 06/03/22  0019   TOTAL PROTEIN 6.9   ALBUMIN 2.80*   GLOBULIN 4.1   ALT (SGPT) 10   AST (SGOT) 23   BILIRUBIN 0.2   ALK PHOS 78         Lab 06/03/22  0401 06/03/22  0019   PROBNP  --  794.1   TROPONIN T <0.010  --                  Brief Urine Lab Results     None          Microbiology Results Abnormal     Procedure Component Value - Date/Time    Blood Culture - Blood, Arm, Right [[id number]]  (Normal) Collected: 06/03/22 0355    Lab Status: Preliminary result Specimen: Blood from Arm, Right Updated: 06/04/22 0417     Blood Culture No growth at 24 hours    Blood Culture - Blood, Hand, Right [[id number]]  (Normal) Collected: 06/03/22 0359    Lab Status: Preliminary result Specimen: Blood from Hand, Right Updated: 06/04/22 0417     Blood Culture No growth at 24 hours    MRSA Screen, PCR (Inpatient) - Swab, Nares [[id number]]  (Normal) Collected: 06/03/22 0956    Lab Status: Final result Specimen: Swab from Nares Updated: 06/03/22 1122     MRSA PCR Negative    Narrative:      MRSA Negative    COVID PRE-OP / PRE-PROCEDURE SCREENING ORDER (NO ISOLATION) - Swab, Nasopharynx [[id number]]  (Normal) Collected: 06/03/22 0510    Lab Status: Final result Specimen: Swab from Nasopharynx Updated: 06/03/22 0551    Narrative:      The following orders were created for panel order COVID PRE-OP / PRE-PROCEDURE SCREENING ORDER (NO ISOLATION) - Swab, Nasopharynx.  Procedure                               Abnormality         Status                     ---------                               -----------         ------                     COVID-19 and FLU A/B PCR...[[id number]]  Normal              Final result                 Please view results for these tests on the individual orders.    COVID-19 and FLU A/B PCR - Swab, Nasopharynx [[id number]]  (Normal) Collected: 06/03/22 0510    Lab Status: Final result Specimen: Swab from Nasopharynx Updated: 06/03/22 0551     COVID19 Not  Detected     Influenza A PCR Not Detected     Influenza B PCR Not Detected    Narrative:      Fact sheet for providers: https://www.fda.gov/media/450351/download    Fact sheet for patients: https://www.fda.gov/media/844988/download    Test performed by PCR.          XR Chest 1 View    Result Date: 6/3/2022  EXAMINATION: XR CHEST 1 VW DATE: 6/3/2022 12:30 AM INDICATION:  Dyspnea and fatigue; COMPARISON:  January 22, 2005 FINDINGS: No focal consolidation, pleural effusion, or pneumothorax. Cardiomediastinal silhouette  unremarkable. No acute osseous abnormality.     Impression: 1.  No acute cardiopulmonary process. Electronically signed by:  Bridgett Nina M.D.  6/2/2022 11:59 PM Mountain Time    Duplex Venous Lower Extremity - Bilateral CAR    Result Date: 6/3/2022  · Normal bilateral lower extremity venous duplex scan.      CT Angiogram Chest    Result Date: 6/3/2022  EXAMINATION: CT ANGIOGRAM CHEST WITH IV CONTRAST   INDICATION: Covid, shortness of breath, tachycardia, hypoxia COMPARISON: Radiograph from today PROCEDURE: Multiple axial CT images were obtained of the chest after IV administration of contrast.  Coronal and sagittal reformations as well as MIP images were obtained. CT dose lowering techniques were used, to include: automated exposure control, adjustment for patient size, and or use of iterative reconstruction. FINDINGS: Cardiovascular: No filling defects are seen in the pulmonary arteries. No focal aortic aneurysm or evidence of aortic dissection is seen. There are relatively extensive calcified and noncalcified atherosclerotic plaques in the aorta and branching vessels  including in the coronary arteries. Mediastinum/Dian: Small sliding-type hiatal hernia. There are mildly prominent mediastinal/hilar lymph nodes, some of which are partially calcified. Lungs/Pleura :  No pneumothorax or pleural fluid collection. There is heterogeneous opacity in the posterior medial right lung base. Chest wall and  Axilla: Unremarkable. Bones:  No acute focal bony abnormality seen. Upper abdomen: No focal abnormalities seen.     Impression: No CTA evidence of pulmonary embolism or acute aortic pathology. Heterogeneous opacity in the posterior medial right lung base, concerning for pneumonia. An evolving infarct could potentially have this appearance as well, though no definite pulmonary embolism seen at this site. A follow-up chest CT is recommended in 2  months to ensure complete clearance of this and to exclude an underlying lesion. Relatively extensive atherosclerosis including coronary calcifications. Small sliding-type hiatal hernia. RECOMMENDATION: Follow-up chest CT in 2 months as above. Electronically signed by:  Tera Angel M.D.  6/3/2022 12:58 AM Mountain Time      Results for orders placed during the hospital encounter of 02/25/20    Adult Transthoracic Echo Complete W/ Cont if Necessary Per Protocol    Interpretation Summary  · Estimated EF = 65%.  · Left ventricular systolic function is normal.  · Mild tricuspid valve regurgitation is present.  · Left ventricular diastolic function is normal.  · Normal right ventricular cavity size, wall thickness, systolic function and septal motion noted.  · No evidence of pulmonary hypertension is present.  · There is no evidence of pericardial effusion.  · No significant structural valvular abnormality demonstrated.      I have reviewed the medications:  Scheduled Meds:aspirin, 81 mg, Oral, Daily  brimonidine, 1 drop, Both Eyes, BID  [START ON 6/6/2022] cholecalciferol, 50,000 Units, Oral, Q7 Days  clopidogrel, 75 mg, Oral, Daily  DULoxetine, 60 mg, Oral, Daily  enoxaparin, 40 mg, Subcutaneous, Q24H  folic acid, 1 mg, Oral, Daily  ipratropium-albuterol, 3 mL, Nebulization, 4x Daily - RT  nicotine, 1 patch, Transdermal, Q24H  piperacillin-tazobactam, 3.375 g, Intravenous, Q8H  rosuvastatin, 20 mg, Oral, Daily      Continuous Infusions:   PRN Meds:.•  acetaminophen **OR**  acetaminophen **OR** acetaminophen  •  ipratropium-albuterol  •  sodium chloride    Assessment & Plan   Assessment & Plan     Active Hospital Problems    Diagnosis  POA   • **Pneumonia [J18.9]  Yes   • Acute sepsis (HCC) [A41.9]  Yes   • Chest pain [R07.9]  Yes   • Bilateral carotid bruits [R09.89]  Yes   • Tobacco use [Z72.0]  Yes   • Hyperlipidemia LDL goal <100 [E78.5]  Yes      Resolved Hospital Problems   No resolved problems to display.        Brief Hospital Course to date:  57 year old female presents to the ED with exertional dyspnea over the past 3-4 days.  She was found to have RLL pneumonia on CT chest.     Sepsis secondary to RLL pneumonia  -Febrile to 100.6 overnight with tachycardia.  Required 2 L nasal cannula overnight  - CTA of chest negative for PE  -MRSA PCR negative, DC vancomycin  -Continue Zosyn  -Continue duonebs  -Wean O2 as tolerated  -Will get SLP consult to evaluate for aspiration  -Recommend follow up CT chest outpatient in 2-3 months.    Provider on admission discussed with patient.     Elevated D-dimer  -No PE  -Venous duplex negative  -No need for full anticoagulation     Hyperlipidemia  Bilateral carotid stenosis  -Continue statin, DAPT  -Outpatient appointment with vascular pending      Tobacco use  -cessation education provided   -nicotine patch      Mood disorder  -continue cymbalta      DVT prophylaxis:  lovenox    DVT prophylaxis:  Medical and mechanical DVT prophylaxis orders are present.       AM-PAC 6 Clicks Score (PT): 24 (06/03/22 0800)    Disposition: I expect the patient to be discharged 1-2 days pending improvement.    CODE STATUS:   Code Status and Medical Interventions:   Ordered at: 06/03/22 7165     Level Of Support Discussed With:    Patient     Code Status (Patient has no pulse and is not breathing):    CPR (Attempt to Resuscitate)     Medical Interventions (Patient has pulse or is breathing):    Full Support     This patient's problems and plans were partially  entered by my partner and updated as appropriate by me 06/04/22. Today is my first day evaluating this patient's active medical problems. I Personally reviewed chart and adjusted note to reflect daily changes in management/clinical condition      Ellie Roldan MD  06/04/22

## 2022-06-05 ENCOUNTER — READMISSION MANAGEMENT (OUTPATIENT)
Dept: CALL CENTER | Facility: HOSPITAL | Age: 57
End: 2022-06-05

## 2022-06-05 VITALS
TEMPERATURE: 98 F | WEIGHT: 162.8 LBS | HEART RATE: 91 BPM | BODY MASS INDEX: 23.31 KG/M2 | SYSTOLIC BLOOD PRESSURE: 143 MMHG | RESPIRATION RATE: 20 BRPM | OXYGEN SATURATION: 92 % | HEIGHT: 70 IN | DIASTOLIC BLOOD PRESSURE: 91 MMHG

## 2022-06-05 PROBLEM — A41.9 SEPSIS DUE TO PNEUMONIA (HCC): Status: RESOLVED | Noted: 2022-06-03 | Resolved: 2022-06-05

## 2022-06-05 PROBLEM — R07.9 CHEST PAIN: Status: RESOLVED | Noted: 2022-06-03 | Resolved: 2022-06-05

## 2022-06-05 PROBLEM — J18.9 SEPSIS DUE TO PNEUMONIA (HCC): Status: ACTIVE | Noted: 2022-06-03

## 2022-06-05 PROBLEM — J18.9 SEPSIS DUE TO PNEUMONIA (HCC): Status: RESOLVED | Noted: 2022-06-03 | Resolved: 2022-06-05

## 2022-06-05 LAB
QT INTERVAL: 320 MS
QTC INTERVAL: 441 MS

## 2022-06-05 PROCEDURE — 99239 HOSP IP/OBS DSCHRG MGMT >30: CPT | Performed by: INTERNAL MEDICINE

## 2022-06-05 PROCEDURE — 25010000002 ENOXAPARIN PER 10 MG: Performed by: INTERNAL MEDICINE

## 2022-06-05 PROCEDURE — 25010000002 PIPERACILLIN SOD-TAZOBACTAM PER 1 G: Performed by: NURSE PRACTITIONER

## 2022-06-05 RX ORDER — AMOXICILLIN AND CLAVULANATE POTASSIUM 875; 125 MG/1; MG/1
1 TABLET, FILM COATED ORAL 2 TIMES DAILY
Qty: 10 TABLET | Refills: 0 | Status: SHIPPED | OUTPATIENT
Start: 2022-06-05 | End: 2022-06-10

## 2022-06-05 RX ORDER — DOXYCYCLINE HYCLATE 100 MG/1
100 CAPSULE ORAL 2 TIMES DAILY
Qty: 10 CAPSULE | Refills: 0 | Status: SHIPPED | OUTPATIENT
Start: 2022-06-05 | End: 2022-06-10

## 2022-06-05 RX ADMIN — Medication 10 ML: at 08:21

## 2022-06-05 RX ADMIN — ENOXAPARIN SODIUM 40 MG: 40 INJECTION SUBCUTANEOUS at 08:20

## 2022-06-05 RX ADMIN — BRIMONIDINE TARTRATE 1 DROP: 2 SOLUTION/ DROPS OPHTHALMIC at 08:20

## 2022-06-05 RX ADMIN — TAZOBACTAM SODIUM AND PIPERACILLIN SODIUM 3.38 G: 375; 3 INJECTION, SOLUTION INTRAVENOUS at 10:02

## 2022-06-05 RX ADMIN — CLOPIDOGREL BISULFATE 75 MG: 75 TABLET ORAL at 08:21

## 2022-06-05 RX ADMIN — ASPIRIN 81 MG: 81 TABLET, COATED ORAL at 08:21

## 2022-06-05 RX ADMIN — ACETAMINOPHEN 325MG 650 MG: 325 TABLET ORAL at 02:17

## 2022-06-05 RX ADMIN — TAZOBACTAM SODIUM AND PIPERACILLIN SODIUM 3.38 G: 375; 3 INJECTION, SOLUTION INTRAVENOUS at 02:14

## 2022-06-05 RX ADMIN — DULOXETINE HYDROCHLORIDE 60 MG: 60 CAPSULE, DELAYED RELEASE ORAL at 08:21

## 2022-06-05 RX ADMIN — ROSUVASTATIN 20 MG: 20 TABLET, FILM COATED ORAL at 08:20

## 2022-06-05 RX ADMIN — FOLIC ACID 1 MG: 1 TABLET ORAL at 08:20

## 2022-06-05 NOTE — CASE MANAGEMENT/SOCIAL WORK
Case Management Discharge Note      Final Note: Pt. to be dc'd to home. Family to provide transport.         Selected Continued Care - Admitted Since 6/3/2022     Destination    No services have been selected for the patient.              Durable Medical Equipment    No services have been selected for the patient.              Dialysis/Infusion    No services have been selected for the patient.              Home Medical Care    No services have been selected for the patient.              Therapy    No services have been selected for the patient.              Community Resources    No services have been selected for the patient.              Community & DME    No services have been selected for the patient.                       Final Discharge Disposition Code: 01 - home or self-care

## 2022-06-05 NOTE — DISCHARGE SUMMARY
[facility] Medicine Services  DISCHARGE SUMMARY    Patient Name: Regine Alfred  : 1965  MRN: [id number]    Date of Admission: 6/3/2022 12:55 AM  Date of Discharge:  22  Primary Care Physician: Leticia Reyes APRN    Consults     No orders found from 2022 to 2022.          Hospital Course     Presenting Problem:   Acute sepsis (HCC) [A41.9]  Chest pain [R07.9]    Active Hospital Problems    Diagnosis  POA   • **Pneumonia [J18.9]  Yes   • Bilateral carotid bruits [R09.89]  Yes   • Tobacco use [Z72.0]  Yes   • Hyperlipidemia LDL goal <100 [E78.5]  Yes      Resolved Hospital Problems    Diagnosis Date Resolved POA   • Sepsis due to pneumonia (HCC) [J18.9, A41.9] 2022 Yes   • Chest pain [R07.9] 2022 Yes          Hospital Course:    57 year old female presents to the ED with exertional dyspnea over the past 3-4 days.  She was found to have RLL pneumonia on CT chest. On admission she was febrile and tachycardic.  CT of the chest showed pneumonia but no evidence of a PE.  Bilateral lower extremity duplex was negative. She was started on IV zosyn and vancomycin on presentation.  Vancomycin was discontinued and she was continued on Zosyn for the remainder of her hospital stay. She has received 2 days of IV zosyn. Blood cultures remained negative. Strep pneumo and legionella were negative. Oxygen requirements resolved and symptoms improved.  She is appropriate for discharge.  Will discharge with an additional 5 days of Augmentin and doxycycline.  She will also go home with her Symbicort inhaler.  She can follow-up with her PCP in 2 weeks.  Patient was counseled to return if she has worsening symptoms, worsening shortness of breath, continued fevers and chills.      Discharge Follow Up Recommendations for outpatient labs/diagnostics:  Follow-up with PCP in 2 weeks    Day of Discharge     HPI:   Patient is doing better this morning.  Still feels a bit  congested but feels better than she did yesterday.    Review of Systems  General: denies fevers or chills  CV: denies chest pain  Resp: Shortness of breath improved  Abd: denies abd pain, nausea      Vital Signs:   Temp:  [97.8 °F (36.6 °C)-98.8 °F (37.1 °C)] 98 °F (36.7 °C)  Heart Rate:  [69-95] 91  Resp:  [18-20] 20  BP: (103-169)/(56-94) 143/91  Flow (L/min):  [2] 2      Physical Exam:  Constitutional: No acute distress, awake, alert  Respiratory: Clear to auscultation bilaterally, respiratory effort normal on room air  Cardiovascular: RRR, no murmurs, rubs, or gallops  Gastrointestinal: Positive bowel sounds, soft, nontender, nondistended  Musculoskeletal: No bilateral ankle edema  Psychiatric: Appropriate affect, cooperative  Neurologic: Oriented x 3, no gross focal neurological deficits  Skin: No rashes      Pertinent  and/or Most Recent Results     LAB RESULTS:      Lab 06/04/22 0302 06/03/22  0712 06/03/22  0401 06/03/22  0203 06/03/22  0019   WBC 4.08 6.42  --   --  7.21   HEMOGLOBIN 11.3* 11.7*  --   --  11.4*   HEMATOCRIT 34.3 35.7  --   --  36.6   PLATELETS 249 269  --   --  339   NEUTROS ABS 2.27 4.41  --   --  5.46   IMMATURE GRANS (ABS) 0.01 0.02  --   --  0.02   LYMPHS ABS 0.62* 0.83  --   --  0.64*   MONOS ABS 0.30 0.34  --   --  0.37   EOS ABS 0.85* 0.77*  --   --  0.67*   MCV 90.0 91.1  --   --  93.8   PROCALCITONIN  --   --  0.08  --   --    LACTATE  --   --  0.5  --   --    D DIMER QUANT  --   --   --  1.47*  --          Lab 06/04/22 0302 06/03/22  0712 06/03/22  0019   SODIUM 137 135* 135*   POTASSIUM 3.3* 3.5 4.3   CHLORIDE 100 99 100   CO2 28.0 27.0 23.0   ANION GAP 9.0 9.0 12.0   BUN 8 10 11   CREATININE 0.59 0.63 0.67   EGFR 105.3 103.6 102.1   GLUCOSE 88 92 97   CALCIUM 8.6 8.7 9.1         Lab 06/03/22  0019   TOTAL PROTEIN 6.9   ALBUMIN 2.80*   GLOBULIN 4.1   ALT (SGPT) 10   AST (SGOT) 23   BILIRUBIN 0.2   ALK PHOS 78         Lab 06/03/22  0401 06/03/22  0019   PROBNP  --  794.1    TROPONIN T <0.010  --                  Brief Urine Lab Results     None        Microbiology Results (last 10 days)     Procedure Component Value - Date/Time    Legionella Antigen, Urine - Urine, Urine, Clean Catch [[id number]]  (Normal) Collected: 06/04/22 0934    Lab Status: Final result Specimen: Urine, Clean Catch Updated: 06/04/22 1352     LEGIONELLA ANTIGEN, URINE Negative    S. Pneumo Ag Urine or CSF - Urine, Urine, Clean Catch [[id number]]  (Normal) Collected: 06/04/22 0934    Lab Status: Final result Specimen: Urine, Clean Catch Updated: 06/04/22 1352     Strep Pneumo Ag Negative    MRSA Screen, PCR (Inpatient) - Swab, Nares [[id number]]  (Normal) Collected: 06/03/22 0956    Lab Status: Final result Specimen: Swab from Nares Updated: 06/03/22 1122     MRSA PCR Negative    Narrative:      MRSA Negative    COVID PRE-OP / PRE-PROCEDURE SCREENING ORDER (NO ISOLATION) - Swab, Nasopharynx [[id number]]  (Normal) Collected: 06/03/22 0510    Lab Status: Final result Specimen: Swab from Nasopharynx Updated: 06/03/22 0551    Narrative:      The following orders were created for panel order COVID PRE-OP / PRE-PROCEDURE SCREENING ORDER (NO ISOLATION) - Swab, Nasopharynx.  Procedure                               Abnormality         Status                     ---------                               -----------         ------                     COVID-19 and FLU A/B PCR...[[id number]]  Normal              Final result                 Please view results for these tests on the individual orders.    COVID-19 and FLU A/B PCR - Swab, Nasopharynx [[id number]]  (Normal) Collected: 06/03/22 0510    Lab Status: Final result Specimen: Swab from Nasopharynx Updated: 06/03/22 0551     COVID19 Not Detected     Influenza A PCR Not Detected     Influenza B PCR Not Detected    Narrative:      Fact sheet for providers: https://www.fda.gov/media/158515/download    Fact sheet for patients: https://www.fda.gov/media/110922/download    Test  performed by PCR.    Blood Culture - Blood, Hand, Right [[id number]]  (Normal) Collected: 06/03/22 0359    Lab Status: Preliminary result Specimen: Blood from Hand, Right Updated: 06/05/22 0415     Blood Culture No growth at 2 days    Blood Culture - Blood, Arm, Right [[id number]]  (Normal) Collected: 06/03/22 0355    Lab Status: Preliminary result Specimen: Blood from Arm, Right Updated: 06/05/22 0415     Blood Culture No growth at 2 days          XR Chest 1 View    Result Date: 6/3/2022  EXAMINATION: XR CHEST 1 VW DATE: 6/3/2022 12:30 AM INDICATION:  Dyspnea and fatigue; COMPARISON:  January 22, 2005 FINDINGS: No focal consolidation, pleural effusion, or pneumothorax. Cardiomediastinal silhouette  unremarkable. No acute osseous abnormality.     1.  No acute cardiopulmonary process. Electronically signed by:  Bridgett Nina M.D.  6/2/2022 11:59 PM Mountain Time    Duplex Venous Lower Extremity - Bilateral CAR    Result Date: 6/3/2022  · Normal bilateral lower extremity venous duplex scan.      CT Angiogram Chest    Result Date: 6/3/2022  EXAMINATION: CT ANGIOGRAM CHEST WITH IV CONTRAST   INDICATION: Covid, shortness of breath, tachycardia, hypoxia COMPARISON: Radiograph from today PROCEDURE: Multiple axial CT images were obtained of the chest after IV administration of contrast.  Coronal and sagittal reformations as well as MIP images were obtained. CT dose lowering techniques were used, to include: automated exposure control, adjustment for patient size, and or use of iterative reconstruction. FINDINGS: Cardiovascular: No filling defects are seen in the pulmonary arteries. No focal aortic aneurysm or evidence of aortic dissection is seen. There are relatively extensive calcified and noncalcified atherosclerotic plaques in the aorta and branching vessels  including in the coronary arteries. Mediastinum/Dian: Small sliding-type hiatal hernia. There are mildly prominent mediastinal/hilar lymph nodes, some of which are  partially calcified. Lungs/Pleura :  No pneumothorax or pleural fluid collection. There is heterogeneous opacity in the posterior medial right lung base. Chest wall and Axilla: Unremarkable. Bones:  No acute focal bony abnormality seen. Upper abdomen: No focal abnormalities seen.     No CTA evidence of pulmonary embolism or acute aortic pathology. Heterogeneous opacity in the posterior medial right lung base, concerning for pneumonia. An evolving infarct could potentially have this appearance as well, though no definite pulmonary embolism seen at this site. A follow-up chest CT is recommended in 2  months to ensure complete clearance of this and to exclude an underlying lesion. Relatively extensive atherosclerosis including coronary calcifications. Small sliding-type hiatal hernia. RECOMMENDATION: Follow-up chest CT in 2 months as above. Electronically signed by:  Tera Angel M.D.  6/3/2022 12:58 AM Mountain Time      Results for orders placed during the hospital encounter of 06/03/22    Duplex Venous Lower Extremity - Bilateral CAR    Interpretation Summary  · Normal bilateral lower extremity venous duplex scan.      Results for orders placed during the hospital encounter of 06/03/22    Duplex Venous Lower Extremity - Bilateral CAR    Interpretation Summary  · Normal bilateral lower extremity venous duplex scan.      Results for orders placed during the hospital encounter of 02/25/20    Adult Transthoracic Echo Complete W/ Cont if Necessary Per Protocol    Interpretation Summary  · Estimated EF = 65%.  · Left ventricular systolic function is normal.  · Mild tricuspid valve regurgitation is present.  · Left ventricular diastolic function is normal.  · Normal right ventricular cavity size, wall thickness, systolic function and septal motion noted.  · No evidence of pulmonary hypertension is present.  · There is no evidence of pericardial effusion.  · No significant structural valvular abnormality  demonstrated.      Plan for Follow-up of Pending Labs/Results:   Pending Labs     Order Current Status    Blood Culture - Blood, Arm, Right Preliminary result    Blood Culture - Blood, Hand, Right Preliminary result        Discharge Details        Discharge Medications      New Medications      Instructions Start Date   amoxicillin-clavulanate 875-125 MG per tablet  Commonly known as: Augmentin   1 tablet, Oral, 2 Times Daily      doxycycline 100 MG capsule  Commonly known as: VIBRAMYCIN   100 mg, Oral, 2 Times Daily         Continue These Medications      Instructions Start Date   alendronate 70 MG tablet  Commonly known as: FOSAMAX   70 mg, Oral, Daily      aspirin 81 MG EC tablet   81 mg, Oral, Daily      brimonidine 0.2 % ophthalmic solution  Commonly known as: ALPHAGAN   1 drop, Both Eyes, 2 Times Daily      celecoxib 100 MG capsule  Commonly known as: CeleBREX   100 mg, Oral, Daily      clopidogrel 75 MG tablet  Commonly known as: PLAVIX   75 mg, Oral, Daily      DULoxetine 30 MG capsule  Commonly known as: CYMBALTA   30 mg, Oral, Daily      DULoxetine 60 MG capsule  Commonly known as: CYMBALTA   60 mg, Oral, Daily      folic acid 1 MG tablet  Commonly known as: FOLVITE   No dose, route, or frequency recorded.      methocarbamol 750 MG tablet  Commonly known as: ROBAXIN   750 mg, Oral, As Needed      ondansetron 4 MG tablet  Commonly known as: ZOFRAN   4 mg, Oral, Every 8 Hours PRN      rosuvastatin 20 MG tablet  Commonly known as: CRESTOR   20 mg, Oral, Daily      tiZANidine 4 MG tablet  Commonly known as: ZANAFLEX   4 mg, Oral, As Needed      traMADol 50 MG tablet  Commonly known as: ULTRAM   50 mg, 3 Times Daily      Vitamin D3 1.25 MG (93301 UT) capsule   50,000 Units, Oral, Every 7 Days             Allergies   Allergen Reactions   • Novocain [Procaine] Itching                Discharge Disposition:      Diet:  Hospital:  Diet Order   Procedures   • Diet Regular; Cardiac       Activity:      Restrictions or  Other Recommendations:         CODE STATUS:    Code Status and Medical Interventions:   Ordered at: 06/03/22 0428     Level Of Support Discussed With:    Patient     Code Status (Patient has no pulse and is not breathing):    CPR (Attempt to Resuscitate)     Medical Interventions (Patient has pulse or is breathing):    Full Support       Future Appointments   Date Time Provider Department Center   6/13/2022  7:30 AM John Rose MD [facility]                 Ellie Roldan MD  06/05/22      Time Spent on Discharge:  I spent  35  minutes on this discharge activity which included: face-to-face encounter with the patient, reviewing the data in the system, coordination of the care with the nursing staff as well as consultants, documentation, and entering orders.

## 2022-06-05 NOTE — PLAN OF CARE
Goal Outcome Evaluation:  Patient c/o pain x1 PRN pain medication administered with relief.     No c/o of SOA this shift, 2L NC placed on patient r/t oxygen desaturation while sleeping.     Sinus rhythm per telemetry.

## 2022-06-05 NOTE — PLAN OF CARE
Goal Outcome Evaluation:  Plan of Care Reviewed With: patient           Outcome Evaluation: pt vss on ra. no complaints at this time.

## 2022-06-06 NOTE — OUTREACH NOTE
Prep Survey    Flowsheet Row Responses   [facility] facility patient discharged from? [facility]   Is LACE score < 7 ? No   Emergency Room discharge w/ pulse ox? No   Eligibility Readm Mgmt   Discharge diagnosis acute sepsis, PNA, bilateral carotid bruits   Does the patient have one of the following disease processes/diagnoses(primary or secondary)? Sepsis   Does the patient have Home health ordered? No   Is there a DME ordered? No   Prep survey completed? Yes          JONNATHAN Gresham Registered Nurse

## 2022-06-08 ENCOUNTER — READMISSION MANAGEMENT (OUTPATIENT)
Dept: CALL CENTER | Facility: HOSPITAL | Age: 57
End: 2022-06-08

## 2022-06-08 LAB
BACTERIA SPEC AEROBE CULT: NORMAL
BACTERIA SPEC AEROBE CULT: NORMAL

## 2022-06-08 NOTE — OUTREACH NOTE
Sepsis Week 1 Survey    Flowsheet Row Responses   [facility] facility patient discharged from? [facility]   Does the patient have one of the following disease processes/diagnoses(primary or secondary)? Sepsis   Week 1 attempt successful? No   Unsuccessful attempts Attempt 1          ANNETTE SHI - Licensed Nurse

## 2022-06-10 ENCOUNTER — READMISSION MANAGEMENT (OUTPATIENT)
Dept: CALL CENTER | Facility: HOSPITAL | Age: 57
End: 2022-06-10

## 2022-06-10 NOTE — OUTREACH NOTE
Sepsis Week 1 Survey    Flowsheet Row Responses   [facility] facility patient discharged from? [facility]   Does the patient have one of the following disease processes/diagnoses(primary or secondary)? Sepsis   Week 1 attempt successful? No   Revoke Phone number issues  [no working numbers]          PAMELA CENTENO - Registered Nurse

## 2022-06-13 ENCOUNTER — OFFICE VISIT (OUTPATIENT)
Dept: CARDIAC SURGERY | Facility: CLINIC | Age: 57
End: 2022-06-13

## 2022-06-13 VITALS
SYSTOLIC BLOOD PRESSURE: 152 MMHG | HEIGHT: 69 IN | BODY MASS INDEX: 23.21 KG/M2 | HEART RATE: 64 BPM | OXYGEN SATURATION: 94 % | WEIGHT: 156.7 LBS | DIASTOLIC BLOOD PRESSURE: 76 MMHG | TEMPERATURE: 98.7 F

## 2022-06-13 DIAGNOSIS — I77.1 SUBCLAVIAN ARTERIAL STENOSIS: Primary | ICD-10-CM

## 2022-06-13 PROCEDURE — 99205 OFFICE O/P NEW HI 60 MIN: CPT | Performed by: THORACIC SURGERY (CARDIOTHORACIC VASCULAR SURGERY)

## 2022-06-13 RX ORDER — PREGABALIN 75 MG/1
CAPSULE ORAL AS NEEDED
COMMUNITY
Start: 2022-05-17

## 2022-06-13 NOTE — PROGRESS NOTES
06/13/2022  Patient Information  Regine Alfred                                                                                          [address] [place] [address] [place] [address]   1965  'PCP/Referring Physician'  Leticia Reyes APRN  [phone]  Leticia Reyes*  [phone]  Chief Complaint   Patient presents with   • Consult     N/P per Leticia RUBIO for left subclavian stenosis. Pt states that she is here today for a consult, denies SOB or fatigue.        History of Present Illness:  The patient is a 57-year-old female who was referred to me to evaluate left subclavian stenosis.  This patient has a long history of tobacco abuse and she has known carotid bruits but has not had a carotid duplex scan in a number of years.  She has a CT scan which demonstrates 80 to 90% narrowing in the proximal left subclavian artery.  She denies dizziness or vertebral symptoms but she does have weakness in the left hand and says on a number of occasions she loses her  and drops things from her left hand.  She comes today to discuss the CT scan findings.      Patient Active Problem List   Diagnosis   • IHD (ischemic heart disease)   • Buerger's disease (HCC)   • Hyperlipidemia LDL goal <100   • Rheumatoid arthritis of multiple sites with negative rheumatoid factor (HCC)   • PVD (peripheral vascular disease) with claudication (HCC)   • Tobacco use   • Bilateral carotid bruits   • Pneumonia   • Subclavian arterial stenosis (HCC)     Past Medical History:   Diagnosis Date   • Anxiety    • Arthritis    • Colon polyp    • Easy bruising    • Glaucoma    • H/O blood clots    • Heart attack (HCC)    • Heart murmur    • History of chicken pox      Past Surgical History:   Procedure Laterality Date   • AMPUTATION Left     small section of the ring finger was amputated 2003   • COLONOSCOPY     • CORONARY STENT PLACEMENT     • TUBAL ABDOMINAL LIGATION Bilateral        Current Outpatient Medications:   •   alendronate (FOSAMAX) 70 MG tablet, Take 70 mg by mouth Daily., Disp: , Rfl:   •  aspirin 81 MG EC tablet, Take 81 mg by mouth Daily., Disp: , Rfl:   •  brimonidine (ALPHAGAN) 0.2 % ophthalmic solution, Administer 1 drop to both eyes 2 (Two) Times a Day., Disp: , Rfl:   •  celecoxib (CeleBREX) 100 MG capsule, Take 100 mg by mouth Daily., Disp: , Rfl:   •  Cholecalciferol (VITAMIN D3) 1.25 MG (49007 UT) capsule, Take 50,000 Units by mouth Every 7 (Seven) Days., Disp: , Rfl:   •  clopidogrel (PLAVIX) 75 MG tablet, Take 75 mg by mouth Daily., Disp: , Rfl:   •  DULoxetine (CYMBALTA) 30 MG capsule, Take 30 mg by mouth Daily., Disp: , Rfl:   •  DULoxetine (CYMBALTA) 60 MG capsule, Take 60 mg by mouth Daily., Disp: , Rfl:   •  folic acid (FOLVITE) 1 MG tablet, , Disp: , Rfl:   •  methocarbamol (ROBAXIN) 750 MG tablet, Take  by mouth As Needed for Muscle Spasms. PRN, Disp: , Rfl:   •  ondansetron (ZOFRAN) 4 MG tablet, Take 1 tablet by mouth Every 8 (Eight) Hours As Needed for Nausea or Vomiting. (Patient taking differently: Take  by mouth Every 8 (Eight) Hours As Needed for Nausea or Vomiting. PRN), Disp: 2 tablet, Rfl: 0  •  rosuvastatin (CRESTOR) 20 MG tablet, Take 1 tablet by mouth Daily., Disp: 90 tablet, Rfl: 3  •  tiZANidine (ZANAFLEX) 4 MG tablet, Take 4 mg by mouth As Needed for Muscle Spasms. PRN, Disp: , Rfl:   •  traMADol (ULTRAM) 50 MG tablet, 50 mg 3 (Three) Times a Day. PRN, Disp: , Rfl:   •  pregabalin (LYRICA) 75 MG capsule, , Disp: , Rfl:   Allergies   Allergen Reactions   • Novocain [Procaine] Itching            Social History     Socioeconomic History   • Marital status: Legally    • Number of children: 4   Tobacco Use   • Smoking status: Current Every Day Smoker     Packs/day: 2.00     Years: 50.00     Pack years: 100.00     Types: Cigarettes   • Smokeless tobacco: Never Used   • Tobacco comment: 50 years smoking off and on for the last 50 years, pt states that she is trying to quit as of this  "May   Vaping Use   • Vaping Use: Never used   Substance and Sexual Activity   • Alcohol use: Not Currently     Comment: stopped drinking years ago   • Drug use: Not Currently   • Sexual activity: Defer     Family History   Problem Relation Age of Onset   • Heart disease Mother    • Heart disease Father    • Heart disease Sister    • Heart attack Sister    • Heart disease Brother    • Heart disease Brother    • Colon polyps Neg Hx    • Colon cancer Neg Hx      Review of Systems   Constitutional: Positive for night sweats. Negative for chills, decreased appetite, fever, malaise/fatigue, weight gain and weight loss.   HENT: Negative for hearing loss.    Cardiovascular: Positive for irregular heartbeat. Negative for chest pain, claudication, cyanosis, dyspnea on exertion, leg swelling, near-syncope, orthopnea, palpitations, paroxysmal nocturnal dyspnea and syncope.   Respiratory: Positive for cough and sputum production.    Endocrine: Negative for polydipsia, polyphagia and polyuria.   Hematologic/Lymphatic: Positive for bleeding problem. Negative for adenopathy. Bruises/bleeds easily.   Musculoskeletal: Positive for arthritis and joint pain. Negative for falls, gout, joint swelling, muscle weakness and myalgias.   Gastrointestinal: Negative for abdominal pain, anorexia, dysphagia, heartburn, nausea and vomiting.   Genitourinary: Negative for dysuria and hematuria.   Neurological: Negative for dizziness, focal weakness, headaches, loss of balance, numbness, seizures, vertigo and weakness.   Psychiatric/Behavioral: Negative for altered mental status, depression, substance abuse and suicidal ideas. The patient is nervous/anxious.    Allergic/Immunologic: Negative for HIV exposure and persistent infections.     Vitals:    06/13/22 0820   BP: 152/76   Pulse: 64   Temp: 98.7 °F (37.1 °C)   SpO2: 94%   Weight: 71.1 kg (156 lb 11.2 oz)   Height: 175.3 cm (69\")      Physical Exam    CONSTITUTIONAL: Alert and conversant, Well " dressed, Well nourished, No acute distress  EYES: Sclera clean, Anicteric, Pupils equal  ENT: No nasal deviation, Trachea midline  NECK: No neck masses, Supple  LUNGS: No wheezing, Cough, non-congested  HEART: No rubs, No murmurs  GASTROINTESTINAL: Soft, non-distended, No masses, Non tender  to palpation, normal bowel sounds  NEURO: No motor deficits, No sensory deficits, Cranial Nerves 2 through 12 grossly intact  PSYCHIATRIC: Oriented to person, place and time, No memory deficits, Mood appropriate  VASCULAR: No carotid bruits, Femoral pulses palpable and symmetric.  The right radial pulses strong the left radial pulse cannot be palpated  MUSKULOSKELETAL: No extremity trauma or extremity asymmetry    The ROS, past medical history, surgical history, family history, social history and vitals were reviewed by myself and corrected as needed.      Labs/Imaging:   Not listed above is that we have obtained blood pressures in both arms and there is a 50 point pressure drop systolic from the left to the right arm.  I reviewed the outside CT scan images demonstrating left subclavian stenosis.    Assessment/Plan:   The patient is a 57-year-old female who has left subclavian artery stenosis by CT angiogram at an outside facility.  She has a 50 point pressure differential between the right and left brachial arteries.  Her left radial pulses are not palpable although her fingers are viable.  She agrees to proceed with an aortogram with possible left subclavian stent. She is aware of the risks of bleeding, infection and stroke.  I have indicated that typically the patients are discharged on the same day but in some instances they may stay overnight and she is agreeable to proceed.  We will obtain a carotid duplex scan in Dr. San's office as he has seen this patient many times in the past and he is agreed to do that scan today.  Obviously, if there is any indication of carotid disease, we may proceed with a CT angiogram of the  carotids prior to the subclavian stent.    Patient Active Problem List   Diagnosis   • IHD (ischemic heart disease)   • Buerger's disease (HCC)   • Hyperlipidemia LDL goal <100   • Rheumatoid arthritis of multiple sites with negative rheumatoid factor (HCC)   • PVD (peripheral vascular disease) with claudication (HCC)   • Tobacco use   • Bilateral carotid bruits   • Pneumonia   • Subclavian arterial stenosis (HCC)       CC: JOANNE Santiago editing for John Rose MD      I, John Rose MD, have read and agree with the editing done by Barbara Ceja, .

## 2022-06-13 NOTE — H&P (VIEW-ONLY)
06/13/2022  Patient Information  Regine Alfred                                                                                          [address] [place] [address] [place] [address]   1965  'PCP/Referring Physician'  Leticia Reyes APRN  [phone]  Leticia Reyes*  [phone]  Chief Complaint   Patient presents with   • Consult     N/P per Leticai RUBIO for left subclavian stenosis. Pt states that she is here today for a consult, denies SOB or fatigue.        History of Present Illness:  The patient is a 57-year-old female who was referred to me to evaluate left subclavian stenosis.  This patient has a long history of tobacco abuse and she has known carotid bruits but has not had a carotid duplex scan in a number of years.  She has a CT scan which demonstrates 80 to 90% narrowing in the proximal left subclavian artery.  She denies dizziness or vertebral symptoms but she does have weakness in the left hand and says on a number of occasions she loses her  and drops things from her left hand.  She comes today to discuss the CT scan findings.      Patient Active Problem List   Diagnosis   • IHD (ischemic heart disease)   • Buerger's disease (HCC)   • Hyperlipidemia LDL goal <100   • Rheumatoid arthritis of multiple sites with negative rheumatoid factor (HCC)   • PVD (peripheral vascular disease) with claudication (HCC)   • Tobacco use   • Bilateral carotid bruits   • Pneumonia   • Subclavian arterial stenosis (HCC)     Past Medical History:   Diagnosis Date   • Anxiety    • Arthritis    • Colon polyp    • Easy bruising    • Glaucoma    • H/O blood clots    • Heart attack (HCC)    • Heart murmur    • History of chicken pox      Past Surgical History:   Procedure Laterality Date   • AMPUTATION Left     small section of the ring finger was amputated 2003   • COLONOSCOPY     • CORONARY STENT PLACEMENT     • TUBAL ABDOMINAL LIGATION Bilateral        Current Outpatient Medications:   •   alendronate (FOSAMAX) 70 MG tablet, Take 70 mg by mouth Daily., Disp: , Rfl:   •  aspirin 81 MG EC tablet, Take 81 mg by mouth Daily., Disp: , Rfl:   •  brimonidine (ALPHAGAN) 0.2 % ophthalmic solution, Administer 1 drop to both eyes 2 (Two) Times a Day., Disp: , Rfl:   •  celecoxib (CeleBREX) 100 MG capsule, Take 100 mg by mouth Daily., Disp: , Rfl:   •  Cholecalciferol (VITAMIN D3) 1.25 MG (59926 UT) capsule, Take 50,000 Units by mouth Every 7 (Seven) Days., Disp: , Rfl:   •  clopidogrel (PLAVIX) 75 MG tablet, Take 75 mg by mouth Daily., Disp: , Rfl:   •  DULoxetine (CYMBALTA) 30 MG capsule, Take 30 mg by mouth Daily., Disp: , Rfl:   •  DULoxetine (CYMBALTA) 60 MG capsule, Take 60 mg by mouth Daily., Disp: , Rfl:   •  folic acid (FOLVITE) 1 MG tablet, , Disp: , Rfl:   •  methocarbamol (ROBAXIN) 750 MG tablet, Take  by mouth As Needed for Muscle Spasms. PRN, Disp: , Rfl:   •  ondansetron (ZOFRAN) 4 MG tablet, Take 1 tablet by mouth Every 8 (Eight) Hours As Needed for Nausea or Vomiting. (Patient taking differently: Take  by mouth Every 8 (Eight) Hours As Needed for Nausea or Vomiting. PRN), Disp: 2 tablet, Rfl: 0  •  rosuvastatin (CRESTOR) 20 MG tablet, Take 1 tablet by mouth Daily., Disp: 90 tablet, Rfl: 3  •  tiZANidine (ZANAFLEX) 4 MG tablet, Take 4 mg by mouth As Needed for Muscle Spasms. PRN, Disp: , Rfl:   •  traMADol (ULTRAM) 50 MG tablet, 50 mg 3 (Three) Times a Day. PRN, Disp: , Rfl:   •  pregabalin (LYRICA) 75 MG capsule, , Disp: , Rfl:   Allergies   Allergen Reactions   • Novocain [Procaine] Itching            Social History     Socioeconomic History   • Marital status: Legally    • Number of children: 4   Tobacco Use   • Smoking status: Current Every Day Smoker     Packs/day: 2.00     Years: 50.00     Pack years: 100.00     Types: Cigarettes   • Smokeless tobacco: Never Used   • Tobacco comment: 50 years smoking off and on for the last 50 years, pt states that she is trying to quit as of this  "May   Vaping Use   • Vaping Use: Never used   Substance and Sexual Activity   • Alcohol use: Not Currently     Comment: stopped drinking years ago   • Drug use: Not Currently   • Sexual activity: Defer     Family History   Problem Relation Age of Onset   • Heart disease Mother    • Heart disease Father    • Heart disease Sister    • Heart attack Sister    • Heart disease Brother    • Heart disease Brother    • Colon polyps Neg Hx    • Colon cancer Neg Hx      Review of Systems   Constitutional: Positive for night sweats. Negative for chills, decreased appetite, fever, malaise/fatigue, weight gain and weight loss.   HENT: Negative for hearing loss.    Cardiovascular: Positive for irregular heartbeat. Negative for chest pain, claudication, cyanosis, dyspnea on exertion, leg swelling, near-syncope, orthopnea, palpitations, paroxysmal nocturnal dyspnea and syncope.   Respiratory: Positive for cough and sputum production.    Endocrine: Negative for polydipsia, polyphagia and polyuria.   Hematologic/Lymphatic: Positive for bleeding problem. Negative for adenopathy. Bruises/bleeds easily.   Musculoskeletal: Positive for arthritis and joint pain. Negative for falls, gout, joint swelling, muscle weakness and myalgias.   Gastrointestinal: Negative for abdominal pain, anorexia, dysphagia, heartburn, nausea and vomiting.   Genitourinary: Negative for dysuria and hematuria.   Neurological: Negative for dizziness, focal weakness, headaches, loss of balance, numbness, seizures, vertigo and weakness.   Psychiatric/Behavioral: Negative for altered mental status, depression, substance abuse and suicidal ideas. The patient is nervous/anxious.    Allergic/Immunologic: Negative for HIV exposure and persistent infections.     Vitals:    06/13/22 0820   BP: 152/76   Pulse: 64   Temp: 98.7 °F (37.1 °C)   SpO2: 94%   Weight: 71.1 kg (156 lb 11.2 oz)   Height: 175.3 cm (69\")      Physical Exam    CONSTITUTIONAL: Alert and conversant, Well " dressed, Well nourished, No acute distress  EYES: Sclera clean, Anicteric, Pupils equal  ENT: No nasal deviation, Trachea midline  NECK: No neck masses, Supple  LUNGS: No wheezing, Cough, non-congested  HEART: No rubs, No murmurs  GASTROINTESTINAL: Soft, non-distended, No masses, Non tender  to palpation, normal bowel sounds  NEURO: No motor deficits, No sensory deficits, Cranial Nerves 2 through 12 grossly intact  PSYCHIATRIC: Oriented to person, place and time, No memory deficits, Mood appropriate  VASCULAR: No carotid bruits, Femoral pulses palpable and symmetric.  The right radial pulses strong the left radial pulse cannot be palpated  MUSKULOSKELETAL: No extremity trauma or extremity asymmetry    The ROS, past medical history, surgical history, family history, social history and vitals were reviewed by myself and corrected as needed.      Labs/Imaging:   Not listed above is that we have obtained blood pressures in both arms and there is a 50 point pressure drop systolic from the left to the right arm.  I reviewed the outside CT scan images demonstrating left subclavian stenosis.    Assessment/Plan:   The patient is a 57-year-old female who has left subclavian artery stenosis by CT angiogram at an outside facility.  She has a 50 point pressure differential between the right and left brachial arteries.  Her left radial pulses are not palpable although her fingers are viable.  She agrees to proceed with an aortogram with possible left subclavian stent. She is aware of the risks of bleeding, infection and stroke.  I have indicated that typically the patients are discharged on the same day but in some instances they may stay overnight and she is agreeable to proceed.  We will obtain a carotid duplex scan in Dr. San's office as he has seen this patient many times in the past and he is agreed to do that scan today.  Obviously, if there is any indication of carotid disease, we may proceed with a CT angiogram of the  carotids prior to the subclavian stent.    Patient Active Problem List   Diagnosis   • IHD (ischemic heart disease)   • Buerger's disease (HCC)   • Hyperlipidemia LDL goal <100   • Rheumatoid arthritis of multiple sites with negative rheumatoid factor (HCC)   • PVD (peripheral vascular disease) with claudication (HCC)   • Tobacco use   • Bilateral carotid bruits   • Pneumonia   • Subclavian arterial stenosis (HCC)       CC: JOANNE Santiago editing for John Rose MD      I, John Rose MD, have read and agree with the editing done by Barbara Ceja, .

## 2022-06-14 ENCOUNTER — PREP FOR SURGERY (OUTPATIENT)
Dept: OTHER | Facility: HOSPITAL | Age: 57
End: 2022-06-14

## 2022-06-14 DIAGNOSIS — I77.1 SUBCLAVIAN ARTERY STENOSIS: Primary | ICD-10-CM

## 2022-06-14 RX ORDER — CHLORHEXIDINE GLUCONATE 500 MG/1
1 CLOTH TOPICAL EVERY 12 HOURS PRN
Status: CANCELLED | OUTPATIENT
Start: 2022-06-20

## 2022-06-20 ENCOUNTER — PRE-ADMISSION TESTING (OUTPATIENT)
Dept: PREADMISSION TESTING | Facility: HOSPITAL | Age: 57
End: 2022-06-20

## 2022-06-20 ENCOUNTER — HOSPITAL ENCOUNTER (OUTPATIENT)
Dept: GENERAL RADIOLOGY | Facility: HOSPITAL | Age: 57
Discharge: HOME | End: 2022-06-20

## 2022-06-20 VITALS — WEIGHT: 149.8 LBS | HEIGHT: 69 IN | BODY MASS INDEX: 22.19 KG/M2

## 2022-06-20 DIAGNOSIS — I77.1 SUBCLAVIAN ARTERY STENOSIS: ICD-10-CM

## 2022-06-20 LAB
ABO GROUP BLD: NORMAL
AMPHET+METHAMPHET UR QL: NEGATIVE
AMPHETAMINES UR QL: NEGATIVE
ANION GAP SERPL CALCULATED.3IONS-SCNC: 6 MMOL/L (ref 5–15)
APTT PPP: 29.1 SECONDS (ref 22–39)
BARBITURATES UR QL SCN: NEGATIVE
BENZODIAZ UR QL SCN: NEGATIVE
BLD GP AB SCN SERPL QL: NEGATIVE
BUN SERPL-MCNC: 8 MG/DL (ref 6–20)
BUN/CREAT SERPL: 11.4 (ref 7–25)
BUPRENORPHINE SERPL-MCNC: NEGATIVE NG/ML
CALCIUM SPEC-SCNC: 9.4 MG/DL (ref 8.6–10.5)
CANNABINOIDS SERPL QL: NEGATIVE
CHLORIDE SERPL-SCNC: 100 MMOL/L (ref 98–107)
CO2 SERPL-SCNC: 30 MMOL/L (ref 22–29)
COCAINE UR QL: NEGATIVE
CREAT SERPL-MCNC: 0.7 MG/DL (ref 0.57–1)
DEPRECATED RDW RBC AUTO: 47.7 FL (ref 37–54)
EGFRCR SERPLBLD CKD-EPI 2021: 101 ML/MIN/1.73
ERYTHROCYTE [DISTWIDTH] IN BLOOD BY AUTOMATED COUNT: 14 % (ref 12.3–15.4)
GLUCOSE SERPL-MCNC: 108 MG/DL (ref 65–99)
HBA1C MFR BLD: 5.8 % (ref 4.8–5.6)
HCT VFR BLD AUTO: 40.4 % (ref 34–46.6)
HGB BLD-MCNC: 12.9 G/DL (ref 12–15.9)
INR PPP: 0.95 (ref 0.84–1.13)
MCH RBC QN AUTO: 29.4 PG (ref 26.6–33)
MCHC RBC AUTO-ENTMCNC: 31.9 G/DL (ref 31.5–35.7)
MCV RBC AUTO: 92 FL (ref 79–97)
METHADONE UR QL SCN: NEGATIVE
OPIATES UR QL: NEGATIVE
OXYCODONE UR QL SCN: NEGATIVE
PCP UR QL SCN: NEGATIVE
PLATELET # BLD AUTO: 475 10*3/MM3 (ref 140–450)
PMV BLD AUTO: 10.1 FL (ref 6–12)
POTASSIUM SERPL-SCNC: 3.4 MMOL/L (ref 3.5–5.2)
PROPOXYPH UR QL: NEGATIVE
PROTHROMBIN TIME: 12.6 SECONDS (ref 11.4–14.4)
RBC # BLD AUTO: 4.39 10*6/MM3 (ref 3.77–5.28)
RH BLD: POSITIVE
SARS-COV-2 RNA PNL SPEC NAA+PROBE: NOT DETECTED
SODIUM SERPL-SCNC: 136 MMOL/L (ref 136–145)
T&S EXPIRATION DATE: NORMAL
TRICYCLICS UR QL SCN: NEGATIVE
WBC NRBC COR # BLD: 9.95 10*3/MM3 (ref 3.4–10.8)

## 2022-06-20 PROCEDURE — 85730 THROMBOPLASTIN TIME PARTIAL: CPT

## 2022-06-20 PROCEDURE — 36415 COLL VENOUS BLD VENIPUNCTURE: CPT

## 2022-06-20 PROCEDURE — 71046 X-RAY EXAM CHEST 2 VIEWS: CPT

## 2022-06-20 PROCEDURE — U0004 COV-19 TEST NON-CDC HGH THRU: HCPCS

## 2022-06-20 PROCEDURE — 80306 DRUG TEST PRSMV INSTRMNT: CPT | Performed by: PHYSICIAN ASSISTANT

## 2022-06-20 PROCEDURE — 83036 HEMOGLOBIN GLYCOSYLATED A1C: CPT

## 2022-06-20 PROCEDURE — C9803 HOPD COVID-19 SPEC COLLECT: HCPCS

## 2022-06-20 PROCEDURE — 86850 RBC ANTIBODY SCREEN: CPT

## 2022-06-20 PROCEDURE — 85610 PROTHROMBIN TIME: CPT

## 2022-06-20 PROCEDURE — 85027 COMPLETE CBC AUTOMATED: CPT

## 2022-06-20 PROCEDURE — 86900 BLOOD TYPING SEROLOGIC ABO: CPT

## 2022-06-20 PROCEDURE — 80305 DRUG TEST PRSMV DIR OPT OBS: CPT | Performed by: PHYSICIAN ASSISTANT

## 2022-06-20 PROCEDURE — U0005 INFEC AGEN DETEC AMPLI PROBE: HCPCS

## 2022-06-20 PROCEDURE — 80048 BASIC METABOLIC PNL TOTAL CA: CPT

## 2022-06-20 PROCEDURE — 86901 BLOOD TYPING SEROLOGIC RH(D): CPT

## 2022-06-20 RX ORDER — POTASSIUM CHLORIDE 1500 MG/1
TABLET, FILM COATED, EXTENDED RELEASE ORAL
COMMUNITY
Start: 2022-06-15

## 2022-06-20 RX ORDER — CHLORHEXIDINE GLUCONATE 500 MG/1
1 CLOTH TOPICAL EVERY 12 HOURS PRN
Status: ACTIVE | OUTPATIENT
Start: 2022-06-20

## 2022-06-20 RX ORDER — FUROSEMIDE 20 MG/1
TABLET ORAL AS NEEDED
COMMUNITY
Start: 2022-06-15

## 2022-06-20 NOTE — PAT
Patient viewed general PAT education video as instructed in their preoperative information received from their surgeon.  Patient stated the general PAT education video was viewed in its entirety and survey completed.  Copies of [facility] general education handouts (Incentive Spirometry, Meds to Beds Program, Patient Belongings, Pre-op skin preparation instructions, Blood Glucose testing, Visitor policy, Surgery FAQ, Code H) distributed to patient if not printed. Education related to the PAT pass and skin preparation for surgery (if applicable) completed in [facility] as a reinforcement to PAT education video. Patient instructed to return PAT pass provided today as well as completed skin preparation sheet (if applicable) on the day of procedure.     Additionally if patient had not viewed video yet but intended to view it at home or in our waiting area, then referred them to the handout with QR code/link provided during PAT visit.  Instructed patient to complete survey after viewing the video in its entirety.  Encouraged patient/family to read [facility] general education handouts thoroughly and notify PAT staff with any questions or concerns. Patient verbalized understanding of all information and priority content.    An arrival time for procedure was not given during PAT visit. If patient had any questions or concerns about their arrival time, they were instructed to contact their surgeon/physician.  Additionally, if the patient referred to an arrival time that was acquired from their my chart account, patient was encouraged to verify that time with their surgeon/physician.  NO arrival times given in Pre Admission Testing Department.    Patient to apply Chlorhexadine wipes  to surgical area (as instructed) the night before procedure and the AM of procedure. Wipes provided.    Blood bank bracelet applied to patient during Pre Admission Testing visit.  Patient instructed not to remove from arm until after procedure and they are  discharged from the hospital.  Explained to patient that they may shower and get the bracelet wet, but not to immerse under water for longer periods (bathing, swimming, hand dishwashing, etc).  Patient verbalized understanding.    Patient denies any current skin issues.     Patient directed to Radiology Department for CXR after Pre Admission Testing Appointment.     COVID test done in [facility].

## 2022-06-21 ENCOUNTER — ANESTHESIA (OUTPATIENT)
Dept: PERIOP | Facility: HOSPITAL | Age: 57
End: 2022-06-21

## 2022-06-21 ENCOUNTER — APPOINTMENT (OUTPATIENT)
Dept: GENERAL RADIOLOGY | Facility: HOSPITAL | Age: 57
End: 2022-06-21

## 2022-06-21 ENCOUNTER — ANESTHESIA EVENT (OUTPATIENT)
Dept: PERIOP | Facility: HOSPITAL | Age: 57
End: 2022-06-21

## 2022-06-21 ENCOUNTER — HOSPITAL ENCOUNTER (OUTPATIENT)
Facility: HOSPITAL | Age: 57
Discharge: HOME | End: 2022-06-22
Attending: THORACIC SURGERY (CARDIOTHORACIC VASCULAR SURGERY) | Admitting: THORACIC SURGERY (CARDIOTHORACIC VASCULAR SURGERY)

## 2022-06-21 DIAGNOSIS — I77.1 SUBCLAVIAN ARTERY STENOSIS: ICD-10-CM

## 2022-06-21 LAB
ABO GROUP BLD: NORMAL
GLUCOSE BLDC GLUCOMTR-MCNC: 98 MG/DL (ref 70–130)
RH BLD: POSITIVE

## 2022-06-21 PROCEDURE — 25010000002 CEFAZOLIN IN DEXTROSE 2-4 GM/100ML-% SOLUTION: Performed by: PHYSICIAN ASSISTANT

## 2022-06-21 PROCEDURE — 75605 CONTRAST EXAM THORACIC AORTA: CPT | Performed by: THORACIC SURGERY (CARDIOTHORACIC VASCULAR SURGERY)

## 2022-06-21 PROCEDURE — C1769 GUIDE WIRE: HCPCS | Performed by: THORACIC SURGERY (CARDIOTHORACIC VASCULAR SURGERY)

## 2022-06-21 PROCEDURE — C1887 CATHETER, GUIDING: HCPCS | Performed by: THORACIC SURGERY (CARDIOTHORACIC VASCULAR SURGERY)

## 2022-06-21 PROCEDURE — 25010000002 FENTANYL CITRATE (PF) 50 MCG/ML SOLUTION: Performed by: NURSE ANESTHETIST, CERTIFIED REGISTERED

## 2022-06-21 PROCEDURE — 86901 BLOOD TYPING SEROLOGIC RH(D): CPT

## 2022-06-21 PROCEDURE — 25010000002 HEPARIN (PORCINE) PER 1000 UNITS: Performed by: THORACIC SURGERY (CARDIOTHORACIC VASCULAR SURGERY)

## 2022-06-21 PROCEDURE — C1894 INTRO/SHEATH, NON-LASER: HCPCS | Performed by: THORACIC SURGERY (CARDIOTHORACIC VASCULAR SURGERY)

## 2022-06-21 PROCEDURE — 25010000002 PROPOFOL 10 MG/ML EMULSION: Performed by: NURSE ANESTHETIST, CERTIFIED REGISTERED

## 2022-06-21 PROCEDURE — 25010000002 NEOSTIGMINE 10 MG/10ML SOLUTION: Performed by: NURSE ANESTHETIST, CERTIFIED REGISTERED

## 2022-06-21 PROCEDURE — 25010000002 ONDANSETRON PER 1 MG: Performed by: NURSE ANESTHETIST, CERTIFIED REGISTERED

## 2022-06-21 PROCEDURE — 82962 GLUCOSE BLOOD TEST: CPT

## 2022-06-21 PROCEDURE — 75710 ARTERY X-RAYS ARM/LEG: CPT | Performed by: THORACIC SURGERY (CARDIOTHORACIC VASCULAR SURGERY)

## 2022-06-21 PROCEDURE — 25010000002 DEXAMETHASONE PER 1 MG: Performed by: NURSE ANESTHETIST, CERTIFIED REGISTERED

## 2022-06-21 PROCEDURE — 37236 OPEN/PERQ PLACE STENT 1ST: CPT | Performed by: THORACIC SURGERY (CARDIOTHORACIC VASCULAR SURGERY)

## 2022-06-21 PROCEDURE — 86900 BLOOD TYPING SEROLOGIC ABO: CPT

## 2022-06-21 PROCEDURE — 25010000002 IOPAMIDOL 61 % SOLUTION: Performed by: THORACIC SURGERY (CARDIOTHORACIC VASCULAR SURGERY)

## 2022-06-21 PROCEDURE — 36215 PLACE CATHETER IN ARTERY: CPT | Performed by: THORACIC SURGERY (CARDIOTHORACIC VASCULAR SURGERY)

## 2022-06-21 PROCEDURE — C1876 STENT, NON-COA/NON-COV W/DEL: HCPCS | Performed by: THORACIC SURGERY (CARDIOTHORACIC VASCULAR SURGERY)

## 2022-06-21 DEVICE — PXB35-08-57-080 STENT VISI PRO 035 V01
Type: IMPLANTABLE DEVICE | Site: ARTERIAL | Status: FUNCTIONAL
Brand: VISI-PRO™

## 2022-06-21 RX ORDER — SODIUM CHLORIDE 450 MG/100ML
100 INJECTION, SOLUTION INTRAVENOUS CONTINUOUS
Status: DISCONTINUED | OUTPATIENT
Start: 2022-06-21 | End: 2022-06-22 | Stop reason: HOSPADM

## 2022-06-21 RX ORDER — HYDROMORPHONE HYDROCHLORIDE 1 MG/ML
0.5 INJECTION, SOLUTION INTRAMUSCULAR; INTRAVENOUS; SUBCUTANEOUS
Status: DISCONTINUED | OUTPATIENT
Start: 2022-06-21 | End: 2022-06-21 | Stop reason: HOSPADM

## 2022-06-21 RX ORDER — SODIUM CHLORIDE 0.9 % (FLUSH) 0.9 %
10 SYRINGE (ML) INJECTION EVERY 12 HOURS SCHEDULED
Status: DISCONTINUED | OUTPATIENT
Start: 2022-06-21 | End: 2022-06-21 | Stop reason: HOSPADM

## 2022-06-21 RX ORDER — DROPERIDOL 2.5 MG/ML
0.62 INJECTION, SOLUTION INTRAMUSCULAR; INTRAVENOUS
Status: DISCONTINUED | OUTPATIENT
Start: 2022-06-21 | End: 2022-06-21 | Stop reason: HOSPADM

## 2022-06-21 RX ORDER — BRIMONIDINE TARTRATE 2 MG/ML
1 SOLUTION/ DROPS OPHTHALMIC 2 TIMES DAILY
Status: DISCONTINUED | OUTPATIENT
Start: 2022-06-21 | End: 2022-06-22 | Stop reason: HOSPADM

## 2022-06-21 RX ORDER — TRAMADOL HYDROCHLORIDE 50 MG/1
50 TABLET ORAL EVERY 4 HOURS PRN
Status: DISCONTINUED | OUTPATIENT
Start: 2022-06-21 | End: 2022-06-22 | Stop reason: HOSPADM

## 2022-06-21 RX ORDER — IPRATROPIUM BROMIDE AND ALBUTEROL SULFATE 2.5; .5 MG/3ML; MG/3ML
3 SOLUTION RESPIRATORY (INHALATION) ONCE AS NEEDED
Status: DISCONTINUED | OUTPATIENT
Start: 2022-06-21 | End: 2022-06-21 | Stop reason: HOSPADM

## 2022-06-21 RX ORDER — LABETALOL HYDROCHLORIDE 5 MG/ML
5 INJECTION, SOLUTION INTRAVENOUS
Status: DISCONTINUED | OUTPATIENT
Start: 2022-06-21 | End: 2022-06-22 | Stop reason: HOSPADM

## 2022-06-21 RX ORDER — CLOPIDOGREL BISULFATE 75 MG/1
75 TABLET ORAL NIGHTLY
Status: DISCONTINUED | OUTPATIENT
Start: 2022-06-21 | End: 2022-06-22 | Stop reason: HOSPADM

## 2022-06-21 RX ORDER — FENTANYL CITRATE 50 UG/ML
50 INJECTION, SOLUTION INTRAMUSCULAR; INTRAVENOUS
Status: DISCONTINUED | OUTPATIENT
Start: 2022-06-21 | End: 2022-06-21 | Stop reason: HOSPADM

## 2022-06-21 RX ORDER — ASPIRIN 81 MG/1
81 TABLET ORAL DAILY
Status: DISCONTINUED | OUTPATIENT
Start: 2022-06-21 | End: 2022-06-22 | Stop reason: HOSPADM

## 2022-06-21 RX ORDER — DULOXETIN HYDROCHLORIDE 60 MG/1
60 CAPSULE, DELAYED RELEASE ORAL NIGHTLY
Status: DISCONTINUED | OUTPATIENT
Start: 2022-06-21 | End: 2022-06-22 | Stop reason: HOSPADM

## 2022-06-21 RX ORDER — SODIUM CHLORIDE 0.9 % (FLUSH) 0.9 %
3-10 SYRINGE (ML) INJECTION AS NEEDED
Status: DISCONTINUED | OUTPATIENT
Start: 2022-06-21 | End: 2022-06-21 | Stop reason: HOSPADM

## 2022-06-21 RX ORDER — ONDANSETRON 4 MG/1
4 TABLET, FILM COATED ORAL EVERY 6 HOURS PRN
Status: DISCONTINUED | OUTPATIENT
Start: 2022-06-21 | End: 2022-06-22 | Stop reason: HOSPADM

## 2022-06-21 RX ORDER — HYDROCODONE BITARTRATE AND ACETAMINOPHEN 5; 325 MG/1; MG/1
1 TABLET ORAL ONCE AS NEEDED
Status: DISCONTINUED | OUTPATIENT
Start: 2022-06-21 | End: 2022-06-21 | Stop reason: HOSPADM

## 2022-06-21 RX ORDER — ROCURONIUM BROMIDE 10 MG/ML
INJECTION, SOLUTION INTRAVENOUS AS NEEDED
Status: DISCONTINUED | OUTPATIENT
Start: 2022-06-21 | End: 2022-06-21 | Stop reason: SURG

## 2022-06-21 RX ORDER — LABETALOL HYDROCHLORIDE 5 MG/ML
5 INJECTION, SOLUTION INTRAVENOUS
Status: DISCONTINUED | OUTPATIENT
Start: 2022-06-21 | End: 2022-06-21 | Stop reason: HOSPADM

## 2022-06-21 RX ORDER — GLYCOPYRROLATE 0.2 MG/ML
INJECTION INTRAMUSCULAR; INTRAVENOUS AS NEEDED
Status: DISCONTINUED | OUTPATIENT
Start: 2022-06-21 | End: 2022-06-21 | Stop reason: SURG

## 2022-06-21 RX ORDER — MEPERIDINE HYDROCHLORIDE 25 MG/ML
12.5 INJECTION INTRAMUSCULAR; INTRAVENOUS; SUBCUTANEOUS
Status: DISCONTINUED | OUTPATIENT
Start: 2022-06-21 | End: 2022-06-21 | Stop reason: HOSPADM

## 2022-06-21 RX ORDER — FENTANYL CITRATE 50 UG/ML
INJECTION, SOLUTION INTRAMUSCULAR; INTRAVENOUS AS NEEDED
Status: DISCONTINUED | OUTPATIENT
Start: 2022-06-21 | End: 2022-06-21 | Stop reason: SURG

## 2022-06-21 RX ORDER — BUPIVACAINE HCL/0.9 % NACL/PF 0.125 %
PLASTIC BAG, INJECTION (ML) EPIDURAL AS NEEDED
Status: DISCONTINUED | OUTPATIENT
Start: 2022-06-21 | End: 2022-06-21 | Stop reason: SURG

## 2022-06-21 RX ORDER — NICARDIPINE HYDROCHLORIDE 2.5 MG/ML
INJECTION INTRAVENOUS CONTINUOUS PRN
Status: DISCONTINUED | OUTPATIENT
Start: 2022-06-21 | End: 2022-06-21 | Stop reason: SURG

## 2022-06-21 RX ORDER — SODIUM CHLORIDE 0.9 % (FLUSH) 0.9 %
3 SYRINGE (ML) INJECTION EVERY 12 HOURS SCHEDULED
Status: DISCONTINUED | OUTPATIENT
Start: 2022-06-21 | End: 2022-06-21 | Stop reason: HOSPADM

## 2022-06-21 RX ORDER — ONDANSETRON 2 MG/ML
4 INJECTION INTRAMUSCULAR; INTRAVENOUS ONCE AS NEEDED
Status: DISCONTINUED | OUTPATIENT
Start: 2022-06-21 | End: 2022-06-21 | Stop reason: HOSPADM

## 2022-06-21 RX ORDER — ONDANSETRON 2 MG/ML
INJECTION INTRAMUSCULAR; INTRAVENOUS AS NEEDED
Status: DISCONTINUED | OUTPATIENT
Start: 2022-06-21 | End: 2022-06-21 | Stop reason: SURG

## 2022-06-21 RX ORDER — CEFAZOLIN SODIUM 2 G/100ML
2 INJECTION, SOLUTION INTRAVENOUS ONCE
Status: COMPLETED | OUTPATIENT
Start: 2022-06-21 | End: 2022-06-21

## 2022-06-21 RX ORDER — PROMETHAZINE HYDROCHLORIDE 25 MG/1
25 SUPPOSITORY RECTAL ONCE AS NEEDED
Status: DISCONTINUED | OUTPATIENT
Start: 2022-06-21 | End: 2022-06-21 | Stop reason: HOSPADM

## 2022-06-21 RX ORDER — PROMETHAZINE HYDROCHLORIDE 25 MG/1
25 TABLET ORAL ONCE AS NEEDED
Status: DISCONTINUED | OUTPATIENT
Start: 2022-06-21 | End: 2022-06-21 | Stop reason: HOSPADM

## 2022-06-21 RX ORDER — SODIUM CHLORIDE, SODIUM LACTATE, POTASSIUM CHLORIDE, CALCIUM CHLORIDE 600; 310; 30; 20 MG/100ML; MG/100ML; MG/100ML; MG/100ML
9 INJECTION, SOLUTION INTRAVENOUS CONTINUOUS PRN
Status: DISCONTINUED | OUTPATIENT
Start: 2022-06-21 | End: 2022-06-22 | Stop reason: HOSPADM

## 2022-06-21 RX ORDER — NEOSTIGMINE METHYLSULFATE 1 MG/ML
INJECTION, SOLUTION INTRAVENOUS AS NEEDED
Status: DISCONTINUED | OUTPATIENT
Start: 2022-06-21 | End: 2022-06-21 | Stop reason: SURG

## 2022-06-21 RX ORDER — SODIUM CHLORIDE 0.9 % (FLUSH) 0.9 %
10 SYRINGE (ML) INJECTION AS NEEDED
Status: DISCONTINUED | OUTPATIENT
Start: 2022-06-21 | End: 2022-06-21 | Stop reason: HOSPADM

## 2022-06-21 RX ORDER — HYDRALAZINE HYDROCHLORIDE 20 MG/ML
5 INJECTION INTRAMUSCULAR; INTRAVENOUS
Status: DISCONTINUED | OUTPATIENT
Start: 2022-06-21 | End: 2022-06-21 | Stop reason: HOSPADM

## 2022-06-21 RX ORDER — PANTOPRAZOLE SODIUM 40 MG/1
40 TABLET, DELAYED RELEASE ORAL DAILY
COMMUNITY

## 2022-06-21 RX ORDER — DROPERIDOL 2.5 MG/ML
0.62 INJECTION, SOLUTION INTRAMUSCULAR; INTRAVENOUS ONCE AS NEEDED
Status: DISCONTINUED | OUTPATIENT
Start: 2022-06-21 | End: 2022-06-21 | Stop reason: HOSPADM

## 2022-06-21 RX ORDER — DEXAMETHASONE SODIUM PHOSPHATE 4 MG/ML
INJECTION, SOLUTION INTRA-ARTICULAR; INTRALESIONAL; INTRAMUSCULAR; INTRAVENOUS; SOFT TISSUE AS NEEDED
Status: DISCONTINUED | OUTPATIENT
Start: 2022-06-21 | End: 2022-06-21 | Stop reason: SURG

## 2022-06-21 RX ORDER — NICARDIPINE HYDROCHLORIDE 2.5 MG/ML
INJECTION INTRAVENOUS
Status: DISPENSED
Start: 2022-06-21 | End: 2022-06-22

## 2022-06-21 RX ORDER — ROSUVASTATIN CALCIUM 20 MG/1
20 TABLET, COATED ORAL NIGHTLY
Status: DISCONTINUED | OUTPATIENT
Start: 2022-06-21 | End: 2022-06-22 | Stop reason: HOSPADM

## 2022-06-21 RX ORDER — DULOXETIN HYDROCHLORIDE 30 MG/1
30 CAPSULE, DELAYED RELEASE ORAL DAILY
Status: DISCONTINUED | OUTPATIENT
Start: 2022-06-22 | End: 2022-06-22 | Stop reason: HOSPADM

## 2022-06-21 RX ORDER — NALOXONE HCL 0.4 MG/ML
0.4 VIAL (ML) INJECTION AS NEEDED
Status: DISCONTINUED | OUTPATIENT
Start: 2022-06-21 | End: 2022-06-21 | Stop reason: HOSPADM

## 2022-06-21 RX ORDER — ONDANSETRON 2 MG/ML
4 INJECTION INTRAMUSCULAR; INTRAVENOUS EVERY 6 HOURS PRN
Status: DISCONTINUED | OUTPATIENT
Start: 2022-06-21 | End: 2022-06-22 | Stop reason: HOSPADM

## 2022-06-21 RX ORDER — CELECOXIB 100 MG/1
100 CAPSULE ORAL DAILY
Status: DISCONTINUED | OUTPATIENT
Start: 2022-06-21 | End: 2022-06-22 | Stop reason: HOSPADM

## 2022-06-21 RX ORDER — PROPOFOL 10 MG/ML
VIAL (ML) INTRAVENOUS AS NEEDED
Status: DISCONTINUED | OUTPATIENT
Start: 2022-06-21 | End: 2022-06-21 | Stop reason: SURG

## 2022-06-21 RX ORDER — ACETAMINOPHEN 325 MG/1
650 TABLET ORAL EVERY 4 HOURS PRN
Status: DISCONTINUED | OUTPATIENT
Start: 2022-06-21 | End: 2022-06-22 | Stop reason: HOSPADM

## 2022-06-21 RX ORDER — FAMOTIDINE 20 MG/1
20 TABLET, FILM COATED ORAL
Status: COMPLETED | OUTPATIENT
Start: 2022-06-21 | End: 2022-06-21

## 2022-06-21 RX ADMIN — Medication 100 MCG: at 13:01

## 2022-06-21 RX ADMIN — SODIUM CHLORIDE, POTASSIUM CHLORIDE, SODIUM LACTATE AND CALCIUM CHLORIDE 9 ML/HR: 600; 310; 30; 20 INJECTION, SOLUTION INTRAVENOUS at 10:52

## 2022-06-21 RX ADMIN — CEFAZOLIN SODIUM 2 G: 2 INJECTION, SOLUTION INTRAVENOUS at 12:25

## 2022-06-21 RX ADMIN — BRIMONIDINE TARTRATE 1 DROP: 2 SOLUTION/ DROPS OPHTHALMIC at 22:58

## 2022-06-21 RX ADMIN — CLOPIDOGREL BISULFATE 75 MG: 75 TABLET ORAL at 21:12

## 2022-06-21 RX ADMIN — NICARDIPINE HYDROCHLORIDE 15 MG/HR: 25 INJECTION INTRAVENOUS at 13:12

## 2022-06-21 RX ADMIN — NEOSTIGMINE METHYLSULFATE 3 MG: 0.5 INJECTION INTRAVENOUS at 12:58

## 2022-06-21 RX ADMIN — SODIUM CHLORIDE 100 ML/HR: 4.5 INJECTION, SOLUTION INTRAVENOUS at 15:45

## 2022-06-21 RX ADMIN — PROPOFOL 180 MG: 10 INJECTION, EMULSION INTRAVENOUS at 12:30

## 2022-06-21 RX ADMIN — DULOXETINE HYDROCHLORIDE 60 MG: 60 CAPSULE, DELAYED RELEASE ORAL at 21:12

## 2022-06-21 RX ADMIN — DEXAMETHASONE SODIUM PHOSPHATE 4 MG: 4 INJECTION, SOLUTION INTRA-ARTICULAR; INTRALESIONAL; INTRAMUSCULAR; INTRAVENOUS; SOFT TISSUE at 12:38

## 2022-06-21 RX ADMIN — CELECOXIB 100 MG: 100 CAPSULE ORAL at 16:02

## 2022-06-21 RX ADMIN — ONDANSETRON 4 MG: 2 INJECTION INTRAMUSCULAR; INTRAVENOUS at 12:38

## 2022-06-21 RX ADMIN — Medication 3 ML: at 15:49

## 2022-06-21 RX ADMIN — GLYCOPYRROLATE 0.4 MG: 0.2 INJECTION INTRAMUSCULAR; INTRAVENOUS at 12:58

## 2022-06-21 RX ADMIN — SODIUM CHLORIDE 5 MG/HR: 9 INJECTION, SOLUTION INTRAVENOUS at 14:26

## 2022-06-21 RX ADMIN — ROSUVASTATIN 20 MG: 20 TABLET, FILM COATED ORAL at 21:12

## 2022-06-21 RX ADMIN — FENTANYL CITRATE 50 MCG: 50 INJECTION, SOLUTION INTRAMUSCULAR; INTRAVENOUS at 12:30

## 2022-06-21 RX ADMIN — ASPIRIN 81 MG: 81 TABLET, COATED ORAL at 16:02

## 2022-06-21 RX ADMIN — TRAMADOL HYDROCHLORIDE 50 MG: 50 TABLET, COATED ORAL at 21:18

## 2022-06-21 RX ADMIN — FENTANYL CITRATE 50 MCG: 50 INJECTION, SOLUTION INTRAMUSCULAR; INTRAVENOUS at 12:43

## 2022-06-21 RX ADMIN — ROCURONIUM BROMIDE 30 MG: 10 INJECTION, SOLUTION INTRAVENOUS at 12:31

## 2022-06-21 RX ADMIN — FAMOTIDINE 20 MG: 20 TABLET ORAL at 10:53

## 2022-06-21 NOTE — ANESTHESIA POSTPROCEDURE EVALUATION
Patient: Regine Alfred    Procedure Summary     Date: 06/21/22 Room / Location: [facility] OR 02 / [facility] HYBRID [facility]    Anesthesia Start: 1224 Anesthesia Stop:     Procedure: AORTAGRAM, LEFT SUBCLAVIAN ARTERY STENT PLACEMENT (Left ) Diagnosis:       Subclavian arterial stenosis (HCC)      (Subclavian arterial stenosis (HCC) [I77.1])    Surgeons: John Rose MD Provider: Mark Gilbert MD    Anesthesia Type: general ASA Status: 3          Anesthesia Type: general    Vitals  Vitals Value Taken Time   /66 06/21/22 1321   Temp 97.3 °F (36.3 °C) 06/21/22 1320   Pulse 84 06/21/22 1321   Resp 16 06/21/22 1321   SpO2 96 % 06/21/22 1321           Post Anesthesia Care and Evaluation    Patient location during evaluation: PACU  Patient participation: complete - patient participated  Level of consciousness: sleepy but conscious  Pain score: 0  Pain management: adequate    Airway patency: patent  Anesthetic complications: No anesthetic complications  PONV Status: none  Cardiovascular status: stable  Respiratory status: spontaneous ventilation  Hydration status: acceptable  No anesthesia care post op

## 2022-06-21 NOTE — ANESTHESIA PREPROCEDURE EVALUATION
Anesthesia Evaluation                  Airway   Mallampati: II  Dental      Pulmonary    (+) COPD, asthma,  Cardiovascular     (+) past MI ,       Neuro/Psych  GI/Hepatic/Renal/Endo      Musculoskeletal     Abdominal    Substance History      OB/GYN          Other                        Anesthesia Plan    ASA 3     general     intravenous induction     Anesthetic plan, risks, benefits, and alternatives have been provided, discussed and informed consent has been obtained with: patient.        CODE STATUS:

## 2022-06-21 NOTE — ANESTHESIA PROCEDURE NOTES
Airway  Urgency: elective    Date/Time: 6/21/2022 12:31 PM  Airway not difficult    General Information and Staff    Patient location during procedure: OR  CRNA/CAA: Harmony Taylor CRNA    Indications and Patient Condition  Indications for airway management: airway protection    Preoxygenated: yes  MILS not maintained throughout  Mask difficulty assessment: 1 - vent by mask    Final Airway Details  Final airway type: endotracheal airway      Successful airway: ETT  Cuffed: yes   Successful intubation technique: direct laryngoscopy  Endotracheal tube insertion site: oral  Blade: Braulio  Blade size: 3  ETT size (mm): 7.0  Cormack-Lehane Classification: grade I - full view of glottis  Placement verified by: chest auscultation and capnometry   Measured from: lips  ETT/EBT  to lips (cm): 21  Number of attempts at approach: 1  Assessment: lips, teeth, and gum same as pre-op and atraumatic intubation    Additional Comments  Negative epigastric sounds, Breath sound equal bilaterally with symmetric chest rise and fall

## 2022-06-21 NOTE — OP NOTE
Operative Report    Preop Diagnosis: Left arm claudication.  Dizziness.  Probable left subclavian stenosis.            Procedure: Catheter in right femoral artery.  Catheter in thoracic aorta.  Thoracic arch aortogram.  Selective arteriogram left subclavian artery.  Angioplasty and stent of the left subclavian artery with an 8 x 57 balloon expandable stent.  Completion arch aortography.        Surgeons: John Rose MD      Assistant: Blake Benitez PA-C    The Assistant provided services of suctioning, irrigation and retraction.  Also, assisted in suture closure of parts of the skin incision.      Indication: Patient referred to me after a diagnosis from her outside physician of subclavian artery stenosis.  She had been dropping objects with her left hand and had tingling and numbness in the fingers of left hand and left arm claudication.  She had a 50 point pressure differential between the 2 arms.  And CT angiography demonstrating left subclavian stenosis.  She was aware that surgery had a risk of stroke bleeding infection death contrast reaction nephrotoxicity and agreed to proceed        Description: Supine position.  Sterile prep and drape.  Right femoral artery percutaneously cannulated and 035 guide was placed in the transverse aortic arch followed by pigtail catheter.  Aortography demonstrated what appeared to be 2 distinct areas of stenosis within the left subclavian artery.  1 area was 85% narrowed just after its origin and a second area a centimeter and a half distal to this also was narrowed.  I then selectively cannulated the left subclavian artery and positioned an 8 x 57 balloon expandable stent inflated to 10 farida.  Following this 2 separate contrast injections demonstrated both resolution of the stenosis and no evidence of extravasation.  The right femoral 6 Kinyarwanda sheath was removed and manual pressure was applied.  Total contrast given 90 mL of Isovue.  Total fluoroscopy time 6-1/2 minutes.   Acute blood loss less than 5 mL.  Strongly palpable left radial pulse was detected post procedure      Please note that portions of this note were completed with a voice recognition program. Efforts were made to edit the dictations, but occasionally words are mistranscribed.

## 2022-06-21 NOTE — INTERVAL H&P NOTE
[facility] Pre-op    Full history and physical note from office is attached.    VS: /62  HR 84  RR 16  T 97.0  Sat 98%RA    Immunizations:  Influenza:  No  Pneumococcal:  No  Tetanus:  UTD  Covid : No    LAB Results:  Lab Results   Component Value Date    WBC 9.95 06/20/2022    HGB 12.9 06/20/2022    HCT 40.4 06/20/2022    MCV 92.0 06/20/2022     (H) 06/20/2022    NEUTROABS 2.27 06/04/2022    GLUCOSE 108 (H) 06/20/2022    BUN 8 06/20/2022    CREATININE 0.70 06/20/2022     06/20/2022    K 3.4 (L) 06/20/2022     06/20/2022    CO2 30.0 (H) 06/20/2022    CALCIUM 9.4 06/20/2022    ALBUMIN 2.80 (L) 06/03/2022    AST 23 06/03/2022    ALT 10 06/03/2022    BILITOT 0.2 06/03/2022    PTT 29.1 06/20/2022    INR 0.95 06/20/2022       Cancer Staging (if applicable)  Cancer Patient: __ yes __no __unknown__N/A; If yes, clinical stage T:__ N:__M:__, stage group or __N/A      Impression: Subclavian arterial stenosis       Plan: STENT PLACEMENT SUBCLAVIAN ARTERY  Agree with the above.  Plan for thoracic aortogram with left subclavian angioplasty and stenting.  Patient is aware the risk of bleeding infection death contrast reaction nephrotoxicity and possible stroke and agrees to proceed.    Tala Mcknight, JOANNE   6/21/2022   10:16 EDT

## 2022-06-22 VITALS
DIASTOLIC BLOOD PRESSURE: 71 MMHG | WEIGHT: 149.69 LBS | RESPIRATION RATE: 16 BRPM | BODY MASS INDEX: 22.17 KG/M2 | TEMPERATURE: 98.9 F | SYSTOLIC BLOOD PRESSURE: 131 MMHG | HEART RATE: 82 BPM | HEIGHT: 69 IN | OXYGEN SATURATION: 97 %

## 2022-06-22 LAB
ANION GAP SERPL CALCULATED.3IONS-SCNC: 8 MMOL/L (ref 5–15)
BASOPHILS # BLD AUTO: 0.05 10*3/MM3 (ref 0–0.2)
BASOPHILS NFR BLD AUTO: 0.6 % (ref 0–1.5)
BUN SERPL-MCNC: 10 MG/DL (ref 6–20)
BUN/CREAT SERPL: 19.2 (ref 7–25)
CALCIUM SPEC-SCNC: 9 MG/DL (ref 8.6–10.5)
CHLORIDE SERPL-SCNC: 106 MMOL/L (ref 98–107)
CO2 SERPL-SCNC: 28 MMOL/L (ref 22–29)
COTININE UR QL SCN: POSITIVE NG/ML
CREAT SERPL-MCNC: 0.52 MG/DL (ref 0.57–1)
DEPRECATED RDW RBC AUTO: 45.7 FL (ref 37–54)
EGFRCR SERPLBLD CKD-EPI 2021: 108.5 ML/MIN/1.73
EOSINOPHIL # BLD AUTO: 0 10*3/MM3 (ref 0–0.4)
EOSINOPHIL NFR BLD AUTO: 0 % (ref 0.3–6.2)
ERYTHROCYTE [DISTWIDTH] IN BLOOD BY AUTOMATED COUNT: 13.9 % (ref 12.3–15.4)
GLUCOSE SERPL-MCNC: 128 MG/DL (ref 65–99)
HCT VFR BLD AUTO: 37 % (ref 34–46.6)
HGB BLD-MCNC: 11.9 G/DL (ref 12–15.9)
IMM GRANULOCYTES # BLD AUTO: 0.02 10*3/MM3 (ref 0–0.05)
IMM GRANULOCYTES NFR BLD AUTO: 0.2 % (ref 0–0.5)
LYMPHOCYTES # BLD AUTO: 1.26 10*3/MM3 (ref 0.7–3.1)
LYMPHOCYTES NFR BLD AUTO: 14.9 % (ref 19.6–45.3)
Lab: ABNORMAL
MCH RBC QN AUTO: 29.1 PG (ref 26.6–33)
MCHC RBC AUTO-ENTMCNC: 32.2 G/DL (ref 31.5–35.7)
MCV RBC AUTO: 90.5 FL (ref 79–97)
MONOCYTES # BLD AUTO: 0.35 10*3/MM3 (ref 0.1–0.9)
MONOCYTES NFR BLD AUTO: 4.1 % (ref 5–12)
NEUTROPHILS NFR BLD AUTO: 6.78 10*3/MM3 (ref 1.7–7)
NEUTROPHILS NFR BLD AUTO: 80.2 % (ref 42.7–76)
NRBC BLD AUTO-RTO: 0 /100 WBC (ref 0–0.2)
PLATELET # BLD AUTO: 366 10*3/MM3 (ref 140–450)
PMV BLD AUTO: 10.3 FL (ref 6–12)
POTASSIUM SERPL-SCNC: 4.6 MMOL/L (ref 3.5–5.2)
RBC # BLD AUTO: 4.09 10*6/MM3 (ref 3.77–5.28)
SODIUM SERPL-SCNC: 142 MMOL/L (ref 136–145)
WBC NRBC COR # BLD: 8.46 10*3/MM3 (ref 3.4–10.8)

## 2022-06-22 PROCEDURE — 80048 BASIC METABOLIC PNL TOTAL CA: CPT | Performed by: PHYSICIAN ASSISTANT

## 2022-06-22 PROCEDURE — 85025 COMPLETE CBC W/AUTO DIFF WBC: CPT | Performed by: PHYSICIAN ASSISTANT

## 2022-06-22 PROCEDURE — 99213 OFFICE O/P EST LOW 20 MIN: CPT

## 2022-06-22 RX ADMIN — DULOXETINE HYDROCHLORIDE 30 MG: 30 CAPSULE, DELAYED RELEASE ORAL at 08:34

## 2022-06-22 RX ADMIN — ASPIRIN 81 MG: 81 TABLET, COATED ORAL at 08:34

## 2022-06-22 RX ADMIN — CELECOXIB 100 MG: 100 CAPSULE ORAL at 08:34

## 2022-06-22 NOTE — DISCHARGE SUMMARY
[facility] Cardiothoracic Surgery Discharge Summary    Name:  Regine Alfred  MRN Number:  [id number]  Date of Admission: 6/21/2022  Date of Discharge:  6/22/2022    Referred By: John Rose MD  PCP: Leticia Reyes APRN  IP Care Team:  Patient Care Team:  Leticia Reyes APRN as PCP - General (Family Medicine)    Discharge Diagnosis:  Past Medical History:   Diagnosis Date   • Anxiety    • Arthritis    • Asthma     as a child   • Colon polyp    • COPD (chronic obstructive pulmonary disease) (HCC)    • Coronary artery disease    • COVID-19 05/05/2022   • Easy bruising    • Glaucoma    • H/O blood clots 2004   • Heart attack (HCC) 2004   • Heart murmur    • History of chicken pox    • Tobacco use      Active Hospital Problems    Diagnosis  POA   • **Subclavian arterial stenosis (HCC) [I77.1]  Unknown   • Subclavian artery stenosis (HCC) [I77.1]  Yes      Resolved Hospital Problems   No resolved problems to display.     Subclavian arterial stenosis (HCC) [I77.1]  Subclavian artery stenosis (HCC) [I77.1]    Procedures Performed:  Procedure(s):  AORTAGRAM, LEFT SUBCLAVIAN ARTERY STENT PLACEMENT       History of Present Illness:    The patient is a 57-year-old female who was referred to me to evaluate left subclavian stenosis.  This patient has a long history of tobacco abuse and she has known carotid bruits but has not had a carotid duplex scan in a number of years.  She has a CT scan which demonstrates 80 to 90% narrowing in the proximal left subclavian artery.  She denies dizziness or vertebral symptoms but she does have weakness in the left hand and says on a number of occasions she loses her  and drops things from her left hand.  She comes today to discuss the CT scan findings.    Hospital Course:  Patient was admitted to [facility] on 6/21/2022 for scheduled aortogram and left subclavian artery stent placement with Dr. Rose.  She was extubated without  complication and was sent to telemetry floor for further observation.  POD 1: No acute events overnight.  Patient has left palpable radial pulse.  She was discharged home in stable condition.    Discharge Medications:     Discharge Medications      Changes to Medications      Instructions Start Date   ondansetron 4 MG tablet  Commonly known as: ZOFRAN  What changed:   · how much to take  · additional instructions   4 mg, Oral, Every 8 Hours PRN         Continue These Medications      Instructions Start Date   alendronate 70 MG tablet  Commonly known as: FOSAMAX   70 mg, Oral, Every 7 Days      aspirin 81 MG EC tablet   81 mg, Oral, Daily      brimonidine 0.2 % ophthalmic solution  Commonly known as: ALPHAGAN   1 drop, Both Eyes, 2 Times Daily      celecoxib 100 MG capsule  Commonly known as: CeleBREX   100 mg, Oral, Daily      clopidogrel 75 MG tablet  Commonly known as: PLAVIX   75 mg, Oral, Nightly      DULoxetine 30 MG capsule  Commonly known as: CYMBALTA   30 mg, Oral, Every Morning      DULoxetine 60 MG capsule  Commonly known as: CYMBALTA   60 mg, Oral, Nightly      folic acid 1 MG tablet  Commonly known as: FOLVITE   Daily      furosemide 20 MG tablet  Commonly known as: LASIX   As Needed      methocarbamol 750 MG tablet  Commonly known as: ROBAXIN   Oral, As Needed, PRN      pantoprazole 40 MG EC tablet  Commonly known as: PROTONIX   40 mg, Oral, Daily      potassium chloride ER 20 MEQ tablet controlled-release ER tablet  Commonly known as: K-TAB   No dose, route, or frequency recorded.      pregabalin 75 MG capsule  Commonly known as: LYRICA   As Needed      rosuvastatin 20 MG tablet  Commonly known as: CRESTOR   20 mg, Oral, Daily      tiZANidine 4 MG tablet  Commonly known as: ZANAFLEX   4 mg, Oral, As Needed, PRN      traMADol 50 MG tablet  Commonly known as: ULTRAM   50 mg, 3 Times Daily, PRN      Vitamin D3 1.25 MG (66024 UT) capsule   50,000 Units, Oral, Every 7 Days             Allergies:  Allergies    Allergen Reactions   • Novocain [Procaine] Itching              Discharge Diet:  Diet Instructions     Diet: Consistent Carbohydrate, Cardiac      Discharge Diet:  Consistent Carbohydrate  Cardiac             Activity at Discharge:  Activity Instructions     Activity as Tolerated      Bathing Restrictions      Type of Restriction: Bathing    Bathing Restrictions: No Tub Bath    Driving Restrictions      Type of Restriction: Driving    Driving Restrictions: No Driving While Taking Narcotics    Lifting Restrictions      Type of Restriction: Lifting    Lifting Restrictions: Lifting Restriction (Indicate Limit)    Weight Limit (Pounds): 10    Length of Lifting Restriction: until seen at next follow-up appointment          Discharge Education:  Post-Op Education:  Patient was advised on responsible use of opioids in the post-surgical setting.  Patient was advised these medications are highly addictive.  Patient advised on proper disposal of unused opioid medication and was urged to discard any unused opioid medication.  Wound Care:  Patient educated regarding care of post-operative wounds.  Patient is to wash with plain soap and water and pat dry.  Patient is not to use salves on the incision sites.  Patient instructed regarding the signs and symptoms of infection and when to call the office with any concerns.    Follow up Appointments:   No future appointments.  Additional Instructions for the Follow-ups that You Need to Schedule     Call MD With Problems / Concerns   As directed      Instructions:   Please call the CT Surgery office with any questions or concerns you may have [phone]    Order Comments: Instructions: Please call the CT Surgery office with any questions or concerns you may have [phone]          Discharge Follow-up with PCP   As directed       Currently Documented PCP:    Leticia eRyes APRN    PCP Phone Number:    [phone]     Follow Up Details: Follow-up with your PCP within  1-2 weeks         Discharge Follow-up with Specialty: Cardiothoracic Surgery   As directed      Specialty: Cardiothoracic Surgery    Follow Up Details: Follow-up within 2-4 weeks                 Jazmine Ochoa, JOANNE  06/22/22  08:40 EDT    Time: I spent 20 minutes on this discharge activity which included: face-to-face encounter with the patient, reviewing the data in the system, coordination of the care with the nursing staff as well as consultants, documentation, and entering orders.

## 2022-06-22 NOTE — PROGRESS NOTES
CTS Progress Note       LOS: 0 days   Patient Care Team:  Leticia Reyes APRN as PCP - General (Family Medicine)    Chief Complaint: Subclavian arterial stenosis (HCC)    Vital Signs:  Temp:  [97.2 °F (36.2 °C)-98.8 °F (37.1 °C)] 98.5 °F (36.9 °C)  Heart Rate:  [] 82  Resp:  [16-18] 18  BP: ()/(45-81) 144/81    Physical Exam:  Left radial pulse palpable     Results:   Results from last 7 days   Lab Units 06/22/22  0339   WBC 10*3/mm3 8.46   HEMOGLOBIN g/dL 11.9*   HEMATOCRIT % 37.0   PLATELETS 10*3/mm3 366     Results from last 7 days   Lab Units 06/22/22  0339   SODIUM mmol/L 142   POTASSIUM mmol/L 4.6   CHLORIDE mmol/L 106   CO2 mmol/L 28.0   BUN mg/dL 10   CREATININE mg/dL 0.52*   GLUCOSE mg/dL 128*   CALCIUM mg/dL 9.0           Imaging Results (Last 24 Hours)     Procedure Component Value Units Date/Time    XR [place] OR Procedure [[id number]] Resulted: 06/21/22 1302     Updated: 06/21/22 1302          Assessment      Subclavian arterial stenosis (HCC)    Subclavian artery stenosis (HCC)        Plan   May discharge home today    Please note that portions of this note were completed with a voice recognition program. Efforts were made to edit the dictations, but occasionally words are mistranscribed.    John Rose MD  06/22/22  07:14 EDT

## 2022-06-27 ENCOUNTER — TELEPHONE (OUTPATIENT)
Dept: CARDIAC SURGERY | Facility: CLINIC | Age: 57
End: 2022-06-27

## 2022-06-27 NOTE — TELEPHONE ENCOUNTER
Caller: Regine Alfred    Relationship: Self    Best call back number:   [phone]    What is the best time to reach you: ANY    Who are you requesting to speak with (clinical staff, provider,  specific staff member): ANY    Do you know the name of the person who called:PT    What was the call regarding:    PT WANTS TO KNOW WHEN SHE CAN BE RELEASED TO GO BACK TO WORK. SHE HAS RECENTLY HAD STENT PLACEMENT SURGERY ON 6.21.22 W/ DR LAWLER. AND PT IS READY TO RETURN BACK TO WORK SOON AS POSSIBLE.    Do you require a callback: YES

## 2022-06-28 NOTE — TELEPHONE ENCOUNTER
Tried to call patient x 3 with no answer and no option for voice mail.  Can not be release to return to work without being seen for post op appointment.

## 2022-07-18 ENCOUNTER — OFFICE VISIT (OUTPATIENT)
Dept: CARDIAC SURGERY | Facility: CLINIC | Age: 57
End: 2022-07-18

## 2022-07-18 VITALS
DIASTOLIC BLOOD PRESSURE: 80 MMHG | WEIGHT: 153 LBS | BODY MASS INDEX: 22.66 KG/M2 | TEMPERATURE: 98 F | HEART RATE: 75 BPM | SYSTOLIC BLOOD PRESSURE: 140 MMHG | OXYGEN SATURATION: 97 % | HEIGHT: 69 IN

## 2022-07-18 DIAGNOSIS — I77.1 SUBCLAVIAN ARTERY STENOSIS: Primary | ICD-10-CM

## 2022-07-18 PROCEDURE — 99213 OFFICE O/P EST LOW 20 MIN: CPT | Performed by: NURSE PRACTITIONER

## 2022-07-18 NOTE — PROGRESS NOTES
[facility] Cardiothoracic Surgery Office Follow Up Note     Date of Encounter: 2022     Name: Regine Alfred  : 1965     Referred By: No ref. provider found  PCP: Leticia Reyes APRN    Chief Complaint:    Chief Complaint   Patient presents with   • Post-op Follow-up     Hosp D/C Left Subclavian Stent 22       Subjective      History of Present Illness:    Regine Alfred is a 57 y.o. female current smoker with history of RA, HLD on statin therapy, IHD, Buerger's disease, carotid bruits, and PVD with subclavian stenosis s/p angioplasty and stent of left subclavian artery on 2022 by Dr. Rose.  Patient was discharged on POD #1 neurovascularly intact with no readmissions.  She presents today for postop eval with no acute complaints.  She has had resolution of her LUE weakness and  complaints.  She has had no issues from her groin site.    Review of Systems:  Review of Systems   Constitutional: Positive for weight loss (weight fluctuating). Negative for chills, decreased appetite, diaphoresis, fever, malaise/fatigue, night sweats and weight gain.   HENT: Negative for hoarse voice.    Eyes: Negative for blurred vision, double vision and visual disturbance.   Cardiovascular: Negative for chest pain, claudication, dyspnea on exertion, irregular heartbeat, near-syncope, orthopnea, palpitations, paroxysmal nocturnal dyspnea and syncope.   Respiratory: Negative for cough, hemoptysis, shortness of breath, sputum production and wheezing.    Hematologic/Lymphatic: Negative for adenopathy and bleeding problem. Bruises/bleeds easily.   Skin: Negative for color change, nail changes, poor wound healing and rash.   Musculoskeletal: Positive for arthritis, back pain and joint pain. Negative for falls and muscle cramps.   Gastrointestinal: Positive for abdominal pain. Negative for dysphagia and heartburn.   Genitourinary: Negative for flank pain.   Neurological: Negative for brief  paralysis, disturbances in coordination, dizziness, focal weakness, headaches, light-headedness, loss of balance, numbness, paresthesias, sensory change, vertigo and weakness.   Psychiatric/Behavioral: Positive for depression. Negative for suicidal ideas.   Allergic/Immunologic: Negative for persistent infections.       I have reviewed the following portions of the patient's history: allergies, current medications, past family history, past medical history, past social history, past surgical history and problem list and confirm it's accurate.    Allergies:  Allergies   Allergen Reactions   • Novocain [Procaine] Itching              Medications:      Current Outpatient Medications:   •  alendronate (FOSAMAX) 70 MG tablet, Take 70 mg by mouth Every 7 (Seven) Days., Disp: , Rfl:   •  aspirin 81 MG EC tablet, Take 81 mg by mouth Daily., Disp: , Rfl:   •  celecoxib (CeleBREX) 100 MG capsule, Take 100 mg by mouth Daily., Disp: , Rfl:   •  Cholecalciferol (VITAMIN D3) 1.25 MG (74412 UT) capsule, Take 50,000 Units by mouth Every 7 (Seven) Days., Disp: , Rfl:   •  clopidogrel (PLAVIX) 75 MG tablet, Take 75 mg by mouth Every Night., Disp: , Rfl:   •  DULoxetine (CYMBALTA) 30 MG capsule, Take 30 mg by mouth Every Morning., Disp: , Rfl:   •  DULoxetine (CYMBALTA) 60 MG capsule, Take 60 mg by mouth Every Night., Disp: , Rfl:   •  folic acid (FOLVITE) 1 MG tablet, Daily., Disp: , Rfl:   •  furosemide (LASIX) 20 MG tablet, As Needed., Disp: , Rfl:   •  pantoprazole (PROTONIX) 40 MG EC tablet, Take 40 mg by mouth Daily., Disp: , Rfl:   •  potassium chloride ER (K-TAB) 20 MEQ tablet controlled-release ER tablet, , Disp: , Rfl:   •  pregabalin (LYRICA) 75 MG capsule, As Needed., Disp: , Rfl:   •  rosuvastatin (CRESTOR) 20 MG tablet, Take 1 tablet by mouth Daily., Disp: 90 tablet, Rfl: 3  •  tiZANidine (ZANAFLEX) 4 MG tablet, Take 4 mg by mouth As Needed for Muscle Spasms. PRN, Disp: , Rfl:   •  traMADol (ULTRAM) 50 MG tablet, 50 mg 3  (Three) Times a Day. PRN, Disp: , Rfl:   •  ondansetron (ZOFRAN) 4 MG tablet, Take 1 tablet by mouth Every 8 (Eight) Hours As Needed for Nausea or Vomiting. (Patient taking differently: Take  by mouth Every 8 (Eight) Hours As Needed for Nausea or Vomiting. PRN), Disp: 2 tablet, Rfl: 0  No current facility-administered medications for this visit.    Facility-Administered Medications Ordered in Other Visits:   •  Chlorhexidine Gluconate Cloth 2 % pads 1 application, 1 application, Topical, Q12H PRN, Blake Benitez PA    History:   Past Medical History:   Diagnosis Date   • Anxiety    • Arthritis    • Asthma     as a child   • Colon polyp    • COPD (chronic obstructive pulmonary disease) ([facility])    • Coronary artery disease    • COVID-19 05/05/2022   • Easy bruising    • Glaucoma    • H/O blood clots 2004   • Heart attack ([facility]) 2004   • Heart murmur    • History of chicken pox    • Tobacco use        Past Surgical History:   Procedure Laterality Date   • AMPUTATION Left     small section of the ring finger was amputated 2003   • ANGIOPLASTY SUBCLAVIAN ARTERY Left 6/21/2022    Procedure: AORTAGRAM, LEFT SUBCLAVIAN ARTERY STENT PLACEMENT;  Surgeon: John Rose MD;  Location: [facility];  Service: Vascular;  Laterality: Left;  FLUORO: 6 min 6 sec  DOSE: 25.93 mgy  CONTRAST: 90 ml isovue   • COLONOSCOPY     • CORONARY STENT PLACEMENT     • TUBAL ABDOMINAL LIGATION Bilateral        Social History     Socioeconomic History   • Marital status: Legally    • Number of children: 4   Tobacco Use   • Smoking status: Current Every Day Smoker     Packs/day: 2.00     Years: 50.00     Pack years: 100.00     Types: Cigarettes     Start date: 1973   • Smokeless tobacco: Never Used   • Tobacco comment: pt states that she is trying to quit as of June, 2022 (currently down to just above a 1/2 ppd)   Vaping Use   • Vaping Use: Never used   Substance and Sexual Activity   • Alcohol use: Not Currently     Comment:  "stopped drinking years ago   • Drug use: Never   • Sexual activity: Defer        Family History   Problem Relation Age of Onset   • Heart disease Mother    • Heart disease Father    • Heart disease Sister    • Heart attack Sister    • Heart disease Brother    • Heart disease Brother    • Colon polyps Neg Hx    • Colon cancer Neg Hx        Objective   Physical Exam:  Vitals:    07/18/22 0909 07/18/22 0910   BP: 150/80 140/80   BP Location: Left arm Right arm   Patient Position: Sitting Sitting   Pulse: 75    Temp: 98 °F (36.7 °C)    SpO2: 97%    Weight: 69.4 kg (153 lb)    Height: 175.3 cm (69\")       Body mass index is 22.59 kg/m².    Physical Exam  Constitutional:       Appearance: Normal appearance.   Cardiovascular:      Rate and Rhythm: Normal rate.      Pulses:           Radial pulses are 2+ on the right side and 2+ on the left side.        Femoral pulses are 2+ on the right side.  Pulmonary:      Effort: Pulmonary effort is normal.   Skin:     General: Skin is warm and dry.      Comments: Groin without complication, no underlying induration/fluctuation.  No ecchymosis.  No pulsatile mass   Neurological:      Mental Status: She is alert and oriented to person, place, and time. Mental status is at baseline.         Assessment / Plan      Assessment / Plan:  Diagnoses and all orders for this visit:    1. Subclavian artery stenosis s/p stent 6/2022 (Primary)       · PVD with subclavian stenosis s/p angioplasty and stent of left subclavian artery on 6/21/2022 by Dr. Rose.    · Dscharged on POD #1 neurovascularly intact with no readmissions.    · Postop eval with resolution of her LUE weakness and  complaints.   · On exam BUE BPS equal. Radial pulses 2+  · Groin site without complication  · Stable from postoperative standpoint follow-up with us on as-needed basis  · Follow-up with cardiologist as previously determined    Follow Up:   Return on as needed basis.   Or sooner for any further concerns or " worsening sign and symptoms. If unable to reach us in the office please dial 911 or go to the nearest emergency department.      Flori RUBIO  [facility] Cardiothoracic Surgery    Time Spent: I spent 24 minutes caring for Regine on this date of service. This time includes time spent by me in the following activities: preparing for the visit, reviewing tests, obtaining and/or reviewing a separately obtained history, performing a medically appropriate examination and/or evaluation, counseling and educating the patient/family/caregiver, documenting information in the medical record, independently interpreting results and communicating that information with the patient/family/caregiver and care coordination.

## 2023-12-18 ENCOUNTER — OFFICE VISIT (OUTPATIENT)
Dept: FAMILY MEDICINE CLINIC | Facility: CLINIC | Age: 58
End: 2023-12-18
Payer: COMMERCIAL

## 2023-12-18 VITALS
BODY MASS INDEX: 19.55 KG/M2 | WEIGHT: 132 LBS | HEIGHT: 69 IN | HEART RATE: 61 BPM | SYSTOLIC BLOOD PRESSURE: 130 MMHG | OXYGEN SATURATION: 99 % | DIASTOLIC BLOOD PRESSURE: 74 MMHG

## 2023-12-18 DIAGNOSIS — M79.7 FIBROMYALGIA: Primary | ICD-10-CM

## 2023-12-18 DIAGNOSIS — I10 HYPERTENSION, ESSENTIAL: ICD-10-CM

## 2023-12-18 DIAGNOSIS — E78.2 HYPERLIPIDEMIA, MIXED: ICD-10-CM

## 2023-12-18 DIAGNOSIS — M19.90 ARTHRITIS: ICD-10-CM

## 2023-12-18 DIAGNOSIS — I77.1 SUBCLAVIAN ARTERY STENOSIS: ICD-10-CM

## 2023-12-18 DIAGNOSIS — Z79.899 ENCOUNTER FOR LONG-TERM (CURRENT) USE OF OTHER MEDICATIONS: ICD-10-CM

## 2023-12-18 RX ORDER — PROPRANOLOL HCL 60 MG
60 CAPSULE, EXTENDED RELEASE 24HR ORAL DAILY
COMMUNITY
Start: 2023-12-06 | End: 2023-12-18 | Stop reason: SDUPTHER

## 2023-12-18 RX ORDER — ROSUVASTATIN CALCIUM 20 MG/1
20 TABLET, COATED ORAL DAILY
Qty: 90 TABLET | Refills: 0 | Status: SHIPPED | OUTPATIENT
Start: 2023-12-18

## 2023-12-18 RX ORDER — TRAMADOL HYDROCHLORIDE 100 MG/1
100 TABLET, EXTENDED RELEASE ORAL 2 TIMES DAILY
COMMUNITY
End: 2023-12-19 | Stop reason: SDUPTHER

## 2023-12-18 RX ORDER — DULOXETIN HYDROCHLORIDE 30 MG/1
30 CAPSULE, DELAYED RELEASE ORAL EVERY MORNING
Qty: 90 CAPSULE | Refills: 0 | Status: SHIPPED | OUTPATIENT
Start: 2023-12-18

## 2023-12-18 RX ORDER — DULOXETIN HYDROCHLORIDE 60 MG/1
60 CAPSULE, DELAYED RELEASE ORAL DAILY
Qty: 90 CAPSULE | Refills: 0 | Status: SHIPPED | OUTPATIENT
Start: 2023-12-18

## 2023-12-18 RX ORDER — PROPRANOLOL HCL 60 MG
60 CAPSULE, EXTENDED RELEASE 24HR ORAL DAILY
Qty: 90 CAPSULE | Refills: 0 | Status: SHIPPED | OUTPATIENT
Start: 2023-12-18

## 2023-12-18 RX ORDER — FOLIC ACID 1 MG/1
TABLET ORAL
Qty: 90 TABLET | Refills: 0 | Status: SHIPPED | OUTPATIENT
Start: 2023-12-18

## 2023-12-18 RX ORDER — CLOPIDOGREL BISULFATE 75 MG/1
75 TABLET ORAL NIGHTLY
Qty: 90 TABLET | Refills: 0 | Status: SHIPPED | OUTPATIENT
Start: 2023-12-18

## 2023-12-18 NOTE — PROGRESS NOTES
"Chief Complaint  Establish Care    Subjective          Regine Alfred presents to [facility]  History of Present Illness  Patient in today to establish care. She states  had labs done at previous PCP around 1 month ago and will try to get copy sent for review. She states blood pressure has been doing well. Denies chest pain , palpitations or shortness of breath. She is requesting refills on pain medication for fibromyalgia and chronic joint pains. States has been on tramadol and lyrica for several years- does not feel the tramadol is working as well for pain as did in past.  She also takes cymbalta daily. She denies SI/HI.  She is supposed to be following with cardiology for subclavian artery stenosis s/p stent but has not seen in past year and would like us to schedule. She states is utd on mammogram and colon cancer screening at this time. Also states is utd on pap smear.   Hypertension  This is a chronic problem. Pertinent negatives include no blurred vision, chest pain, headaches, palpitations, peripheral edema or shortness of breath.   Hyperlipidemia  This is a chronic problem. Pertinent negatives include no chest pain, myalgias or shortness of breath. Current antihyperlipidemic treatment includes statins.   Fibromyalgia  This is a chronic problem. Associated symptoms include arthralgias. Pertinent negatives include no abdominal pain, change in bowel habit, chest pain, coughing, fatigue, fever, headaches, myalgias, nausea, sore throat or vomiting.       Objective   Vital Signs:   /74   Pulse 61   Ht 175.3 cm (69\")   Wt 59.9 kg (132 lb)   SpO2 99%   BMI 19.49 kg/m²     Body mass index is 19.49 kg/m².    Review of Systems   Constitutional:  Negative for fatigue and fever.   HENT:  Negative for sore throat.    Eyes:  Negative for blurred vision.   Respiratory:  Negative for cough and shortness of breath.    Cardiovascular:  Negative for chest pain and palpitations. "   Gastrointestinal:  Negative for abdominal pain, change in bowel habit, diarrhea, nausea and vomiting.   Genitourinary:  Negative for dysuria and frequency.   Musculoskeletal:  Positive for arthralgias. Negative for myalgias.   Neurological:  Negative for dizziness and headache.   Psychiatric/Behavioral:  Negative for suicidal ideas and depressed mood. The patient is not nervous/anxious.        Past History:  Medical History: has a past medical history of Anxiety, Arthritis, Asthma, Colon polyp, COPD (chronic obstructive pulmonary disease), Coronary artery disease, COVID-19 (05/05/2022), Easy bruising, Glaucoma, H/O blood clots (2004), Heart attack (2004), Heart murmur, History of chicken pox, and Tobacco use.   Surgical History: has a past surgical history that includes Coronary stent placement; Colonoscopy; Tubal ligation (Bilateral); Amputation (Left); and Angioplasty Subclavian Artery (Left, 6/21/2022).   Family History: family history includes Heart attack in her sister; Heart disease in her brother, brother, father, mother, and sister.   Social History: reports that she has been smoking cigarettes. She started smoking about 50 years ago. She has a 25.00 pack-year smoking history. She has never used smokeless tobacco. She reports that she does not currently use alcohol. She reports that she does not use drugs.      Current Outpatient Medications:     clopidogrel (PLAVIX) 75 MG tablet, Take 1 tablet by mouth Every Night., Disp: 90 tablet, Rfl: 0    DULoxetine (CYMBALTA) 30 MG capsule, Take 1 capsule by mouth Every Morning., Disp: 90 capsule, Rfl: 0    folic acid (FOLVITE) 1 MG tablet, Take one tablet by oral route  once a day, Disp: 90 tablet, Rfl: 0    pregabalin (LYRICA) 75 MG capsule, Take 1 capsule by mouth 2 (Two) Times a Day., Disp: , Rfl:     propranolol LA (INDERAL LA) 60 MG 24 hr capsule, Take 1 capsule by mouth Daily., Disp: 90 capsule, Rfl: 0    rosuvastatin (CRESTOR) 20 MG tablet, Take 1 tablet by  mouth Daily., Disp: 90 tablet, Rfl: 0    traMADol ER (ULTRAM-ER) 100 MG 24 hr tablet, Take 1 tablet by mouth 2 (Two) Times a Day., Disp: , Rfl:     DULoxetine (Cymbalta) 60 MG capsule, Take 1 capsule by mouth Daily., Disp: 90 capsule, Rfl: 0  No current facility-administered medications for this visit.    Facility-Administered Medications Ordered in Other Visits:     Chlorhexidine Gluconate Cloth 2 % pads 1 application, 1 application , Topical, Q12H PRN, Blake Benitez PA  Allergies: Novocain [procaine]    Physical Exam  Constitutional:       Appearance: Normal appearance.   HENT:      Right Ear: Tympanic membrane normal.      Left Ear: Tympanic membrane normal.      Mouth/Throat:      Pharynx: Oropharynx is clear.   Eyes:      Conjunctiva/sclera: Conjunctivae normal.      Pupils: Pupils are equal, round, and reactive to light.   Cardiovascular:      Rate and Rhythm: Normal rate and regular rhythm.      Heart sounds: Normal heart sounds.   Pulmonary:      Effort: Pulmonary effort is normal.      Breath sounds: Normal breath sounds.   Abdominal:      Palpations: Abdomen is soft.      Tenderness: There is no abdominal tenderness.   Neurological:      Mental Status: She is oriented to person, place, and time.   Psychiatric:         Mood and Affect: Mood normal.         Behavior: Behavior normal.             Assessment and Plan   Diagnoses and all orders for this visit:    1. Fibromyalgia (Primary)  Refilled cymbalta at current dosage- she states has been on this dosage for several years and denies side effects. Will put in referral for pain management for further evaluation on chronic pain and medication refills.   2. Hyperlipidemia, mixed  Refilled rosuvastatin. She has signed record release to get recent labs sent . Discussed can review and if need additional labs will let patient know.   3. Hypertension, essential  Refilled propranolol. Will put in referral for cardiology follow up and encouraged patient to keep  her appointment in f/up. Rtc prior to recheck as needed.   4. Subclavian artery stenosis s/p stent 6/2022    5. Encounter for long-term (current) use of other medications  -     POC Urine Drug Screen [facility]    Other orders  -     DULoxetine (Cymbalta) 60 MG capsule; Take 1 capsule by mouth Daily.  Dispense: 90 capsule; Refill: 0  -     DULoxetine (CYMBALTA) 30 MG capsule; Take 1 capsule by mouth Every Morning.  Dispense: 90 capsule; Refill: 0  -     folic acid (FOLVITE) 1 MG tablet; Take one tablet by oral route  once a day  Dispense: 90 tablet; Refill: 0  -     propranolol LA (INDERAL LA) 60 MG 24 hr capsule; Take 1 capsule by mouth Daily.  Dispense: 90 capsule; Refill: 0  -     rosuvastatin (CRESTOR) 20 MG tablet; Take 1 tablet by mouth Daily.  Dispense: 90 tablet; Refill: 0  -     clopidogrel (PLAVIX) 75 MG tablet; Take 1 tablet by mouth Every Night.  Dispense: 90 tablet; Refill: 0            Follow Up   No follow-ups on file.  Patient was given instructions and counseling regarding her condition or for health maintenance advice. Please see specific information pulled into the AVS if appropriate.     Maricel Richard PA-C

## 2023-12-19 ENCOUNTER — TELEPHONE (OUTPATIENT)
Dept: FAMILY MEDICINE CLINIC | Facility: CLINIC | Age: 58
End: 2023-12-19
Payer: COMMERCIAL

## 2023-12-19 DIAGNOSIS — M79.7 FIBROMYALGIA: Primary | ICD-10-CM

## 2023-12-19 RX ORDER — PREGABALIN 75 MG/1
75 CAPSULE ORAL 2 TIMES DAILY
Qty: 60 CAPSULE | Refills: 1 | Status: SHIPPED | OUTPATIENT
Start: 2023-12-19

## 2023-12-19 RX ORDER — TRAMADOL HYDROCHLORIDE 100 MG/1
100 TABLET, EXTENDED RELEASE ORAL 2 TIMES DAILY
Qty: 60 TABLET | Refills: 0 | Status: SHIPPED | OUTPATIENT
Start: 2023-12-19

## 2023-12-19 NOTE — TELEPHONE ENCOUNTER
Patient was in yesterday as new patient to discuss getting refill on tramadol and lyrica until can get in with pain mgt;  urine drug screen was negative; yuko placed on desk; thanks

## 2023-12-20 ENCOUNTER — TELEPHONE (OUTPATIENT)
Dept: FAMILY MEDICINE CLINIC | Facility: CLINIC | Age: 58
End: 2023-12-20
Payer: COMMERCIAL

## 2023-12-21 NOTE — TELEPHONE ENCOUNTER
Please check with patient- got copy of colonoscopy report that recommended GI f/up one year after which would have been in 2022- please see if she f/up with GI at [place] - if not, recommend to get scheduled to f/up; thanks

## 2024-01-11 ENCOUNTER — TELEPHONE (OUTPATIENT)
Dept: FAMILY MEDICINE CLINIC | Facility: CLINIC | Age: 59
End: 2024-01-11

## 2024-01-11 NOTE — TELEPHONE ENCOUNTER
Caller: Regine Alfred    Relationship to patient: Self    Best call back number:  [phone]     Chief complaint: NEEDS TO SCHEDULE CARDIOLOGIST AND PAIN MANAGEMENT    Type of visit: CARDIOLOGY AND PAIN MANAGEMENT    Requested date: ASAP     If rescheduling, when is the original appointment: N/A     Additional notes: I CALLED TO WT TO REFERRAL COORDINATOR BUT NO ANSWER.  PLEASE HAVE REFERRAL COORDINATOR CALL PATIENT TO SCHEDULE. THANKS.

## 2024-01-11 NOTE — TELEPHONE ENCOUNTER
LVM for pt to call back  HUB TO RELAY  Please check with patient- got copy of colonoscopy report that recommended GI f/up one year after which would have been in 2022- please see if she f/up with GI at [place] - if not, recommend to get scheduled to f/up; thanks        ALSO CARDIO REFERRAL PT NEEDS TO CALL [phone] to schedule  PAIN CLINIC REFERRAL WAS SENT TO [facility] PT CAN CALL [phone] to schedule.

## 2024-01-15 NOTE — TELEPHONE ENCOUNTER
Name: Regine Alfred    Relationship: Self    Best Callback Number: [phone]     HUB PROVIDED THE RELAY MESSAGE FROM THE OFFICE   PATIENT HAS FURTHER QUESTIONS AND WOULD LIKE A CALL BACK AT THE FOLLOWING PHONE NUMBER [phone]    ADDITIONAL INFORMATION: THE PATIENT REPORTS SHE NEEDS A DIFFERENT REFERRAL TO CARDIOLOGY THAT IS NETWORK WITH HER [employer] ADVANTAGE PLAN, BUT REPORTS SHE IS GOING TO LOOK INTO GETTING HER MA PLANS SWITCHED TO BEING IN NETWORK WITH [facility] AS WELL. PLEASE CALL.

## 2024-01-22 NOTE — TELEPHONE ENCOUNTER
CALLED PT TO SEE IF SHE HAS CHANGED TO A PLAN THAT [facility] ACCEPTS OR IF SHE WOULD LIKE TO GO AHEAD AND BE REFERRED TO A DIFFERENT CARDIOLOGIST.

## 2024-05-15 NOTE — PROGRESS NOTES
New Cardiology Patient Office Visit      Date: 2024  Patient Name: Regine Alfred  : 1965   MRN: [id number]   PCP: Maricel Richard PA-C   Referring Provider: No ref. provider found     Chief Complaint:    Chief Complaint   Patient presents with    IHD (ischemic heart disease)    Hypertension    Subclavian Artery Stenosis       History of Present Illness: Regine Alfred is a 59 y.o. female who is here today for establish as a cardiologist.  Patient has a long history of coronary artery disease and peripheral vascular disease.  Patient was diagnosed with Buerger's disease in the past.  She has been trying to cut down her smoking but is still smoking.  She has a history of stents in the heart in the legs and subclavian.  Cannot feel any pulse in the left radial but is not ischemic.  She denies any chest pain any shortness of breath any dizziness any palpitations or any other symptoms.    She denies any lower extremity edema.  She denies any claudication symptoms at this time.  She does have some varicose vein.      Problem List   CARDIAC  Coronary Artery Disease:   History of cardiac stenting     Myocardium:   Echo, 2020: LVEF 65%     Valvular:   Mild MR     Electrical:   Normal sinus rhythm     Pericardium:   Normal     VASCULAR  Arterial  Cerebrovascular disease:   Carotid Doppler 2020: 50% stenosis     Peripheral vascular disease:   Left subclavian, :90% stenosis s/p stenting     CARDIAC RISK FACTORS  Hypertension  Dyslipidemia  Tobacco Use: Current Smoker and PPY: 52 pack years    NON-CARDIAC  COPD  Buerger's disease    SURGERIES  Tubal ligation      Subjective      Review of Systems:   Review of Systems   Respiratory:  Negative for apnea, cough, choking, chest tightness, shortness of breath, wheezing and stridor.    Cardiovascular:  Negative for chest pain, palpitations and leg swelling.       Medications:   Current Outpatient Medications   Medication Sig Dispense Refill     "gabapentin (NEURONTIN) 300 MG capsule Take 1 capsule by mouth 3 (Three) Times a Day.      HYDROcodone-acetaminophen (NORCO) 5-325 MG per tablet Take 1 tablet by mouth 3 (Three) Times a Day.      rosuvastatin (CRESTOR) 20 MG tablet Take 1 tablet by mouth Daily. 90 tablet 0    tiZANidine (ZANAFLEX) 4 MG tablet Take 1 tablet by mouth As Needed.       No current facility-administered medications for this visit.     Facility-Administered Medications Ordered in Other Visits   Medication Dose Route Frequency Provider Last Rate Last Admin    Chlorhexidine Gluconate Cloth 2 % pads 1 application  1 application  Topical Q12H PRN Blake Benitez PA               The following portions of the patient's history were reviewed and updated as appropriate: allergies, current medications, past family history, past medical history, past social history, past surgical history and problem list.    Objective   Vital Signs:   Vitals:    05/16/24 1006 05/16/24 1016   BP: 162/68 (!) 80/64   BP Location: Right arm Left arm   Patient Position: Sitting Sitting   Cuff Size: Adult Adult   Pulse: 86    Weight: 66.7 kg (147 lb)    Height: 177.8 cm (70\")      Body mass index is 21.09 kg/m².     Physical Exam:  Constitutional:       General: Not in acute distress.     Appearance: Healthy appearance. Not in distress.     Neck:     JVP:Not elevated     Carotid artery: Bilateral carotid bruit    Pulmonary:      Effort: Pulmonary effort is normal.      Breath sounds: Normal breath sounds. No wheezing. No rhonchi. No rales.     Cardiovascular:      Normal rate. Regular rhythm. Normal S1. Normal S2.      Murmurs: There is mild systolic murmur.      No gallop. No click. No rub.     Abdominal:      General: Bowel sounds are normal.      Palpations: Abdomen is soft.      Tenderness: There is no abdominal tenderness.    Extremities:     Pulses:Normal radial and pedal pulses     Edema:no edema      Labs:  Lab Results   Component Value Date    GLUCOSE 128 (H) " 06/22/2022    BUN 10 06/22/2022    CREATININE 0.52 (L) 06/22/2022    BCR 19.2 06/22/2022    K 4.6 06/22/2022    CO2 28.0 06/22/2022    CALCIUM 9.0 06/22/2022    ALBUMIN 2.80 (L) 06/03/2022    AST 23 06/03/2022    ALT 10 06/03/2022     Lab Results   Component Value Date    WBC 8.46 06/22/2022    HGB 11.9 (L) 06/22/2022    HCT 37.0 06/22/2022    MCV 90.5 06/22/2022     06/22/2022       Lab Results   Component Value Date    HGBA1C 5.80 (H) 06/20/2022           ECG 12 Lead    Date/Time: 5/16/2024 10:46 AM  Performed by: Dennys Maloney MD    Authorized by: Dennys Maloney MD  Comparison: compared with previous ECG from 6/3/2022  Comparison to previous ECG: Sinus Tachycardia has resolved  Rhythm: sinus rhythm    Clinical impression: normal ECG          Smoking Cessation:   Tobacco Product History: 3-10 mintues spent counseling Will try to cut down    Advance Care Planning   ACP discussion was declined by the patient. Patient does not have an advance directive, declines further assistance.            Assessment / Plan      Assessment:   Diagnosis Plan   1. Coronary artery disease involving native coronary artery of native heart without angina pectoris  ECG 12 Lead      2. PVD (peripheral vascular disease) with claudication        3. Buerger's disease             Plan:  Patient has extensive coronary and vascular disease which most likely is Buerger's disease.  Patient does not want to take any medications.  But she has agreed to take aspirin and Lipitor.  I had a long discussion with her about quit smoking and she will do it and I will see her back in 6 months.  The name of the game in her case is to quit smoking.  She thinks and believes that in 6 months she will be able to quit smoking.  I believe once she quit smoking she will start feeling much better.            Follow Up:   Return in about 6 months (around 11/16/2024).    Dennys Maloney MD

## 2024-05-16 ENCOUNTER — OFFICE VISIT (OUTPATIENT)
Dept: CARDIOLOGY | Facility: CLINIC | Age: 59
End: 2024-05-16
Payer: MEDICARE

## 2024-05-16 VITALS
WEIGHT: 147 LBS | SYSTOLIC BLOOD PRESSURE: 80 MMHG | DIASTOLIC BLOOD PRESSURE: 64 MMHG | BODY MASS INDEX: 21.05 KG/M2 | HEIGHT: 70 IN | HEART RATE: 86 BPM

## 2024-05-16 DIAGNOSIS — I25.10 CORONARY ARTERY DISEASE INVOLVING NATIVE CORONARY ARTERY OF NATIVE HEART WITHOUT ANGINA PECTORIS: Primary | ICD-10-CM

## 2024-05-16 DIAGNOSIS — I73.1 BUERGER'S DISEASE: ICD-10-CM

## 2024-05-16 DIAGNOSIS — I73.9 PVD (PERIPHERAL VASCULAR DISEASE) WITH CLAUDICATION: ICD-10-CM

## 2024-05-16 PROCEDURE — 1160F RVW MEDS BY RX/DR IN RCRD: CPT | Performed by: INTERNAL MEDICINE

## 2024-05-16 PROCEDURE — 1159F MED LIST DOCD IN RCRD: CPT | Performed by: INTERNAL MEDICINE

## 2024-05-16 PROCEDURE — 93000 ELECTROCARDIOGRAM COMPLETE: CPT | Performed by: INTERNAL MEDICINE

## 2024-05-16 PROCEDURE — 99204 OFFICE O/P NEW MOD 45 MIN: CPT | Performed by: INTERNAL MEDICINE

## 2024-05-16 RX ORDER — TIZANIDINE 4 MG/1
4 TABLET ORAL AS NEEDED
COMMUNITY
Start: 2024-04-16

## 2024-05-16 RX ORDER — GABAPENTIN 300 MG/1
300 CAPSULE ORAL 3 TIMES DAILY
COMMUNITY
Start: 2024-04-18

## 2024-05-16 RX ORDER — HYDROCODONE BITARTRATE AND ACETAMINOPHEN 5; 325 MG/1; MG/1
1 TABLET ORAL 3 TIMES DAILY
COMMUNITY
Start: 2024-04-18

## 2024-09-24 ENCOUNTER — TELEPHONE (OUTPATIENT)
Dept: CARDIOLOGY | Facility: CLINIC | Age: 59
End: 2024-09-24
Payer: MEDICARE

## (undated) DEVICE — CATH GUIDE SOFTVU FLUSH HT PIG .038 5F 110CM

## (undated) DEVICE — GLIDEX™ COATED HYDROPHILIC GUIDEWIRE: Brand: MAGIC TORQUE™

## (undated) DEVICE — RADIFOCUS GLIDEWIRE ADVANTAGE GUIDEWIRE: Brand: GLIDEWIRE ADVANTAGE

## (undated) DEVICE — INFLATION DEVICE: Brand: ENCORE™ 26

## (undated) DEVICE — AVANTI + 5F STD W/GW: Brand: AVANTI

## (undated) DEVICE — SI AVANTI+ 6F STD W/GW  NO OBT: Brand: AVANTI

## (undated) DEVICE — GLV SURG BIOGEL LTX PF 7 1/2

## (undated) DEVICE — PK ANGIO OR 10

## (undated) DEVICE — AVANTI + 4F STD W/GW: Brand: AVANTI

## (undated) DEVICE — TBG INJ CONTRL EXCITE RA 1200PSI 48IN

## (undated) DEVICE — RADIFOCUS GLIDECATH: Brand: GLIDECATH

## (undated) DEVICE — GLV SURG BIOGEL LTX PF 8

## (undated) DEVICE — LN INJ CONTRST FLXCIL HP BR F/M LL W/ADAPT/ROT 1200PSI 48IN

## (undated) DEVICE — CATH GUIDE SOFTVU FLUSH HT PIG .035 4F 65CM